# Patient Record
Sex: FEMALE | Race: WHITE | Employment: FULL TIME | URBAN - METROPOLITAN AREA
[De-identification: names, ages, dates, MRNs, and addresses within clinical notes are randomized per-mention and may not be internally consistent; named-entity substitution may affect disease eponyms.]

---

## 2019-07-30 ENCOUNTER — OFFICE VISIT (OUTPATIENT)
Dept: FAMILY MEDICINE CLINIC | Age: 39
End: 2019-07-30
Payer: MEDICARE

## 2019-07-30 VITALS
HEART RATE: 76 BPM | HEIGHT: 64 IN | RESPIRATION RATE: 16 BRPM | WEIGHT: 169 LBS | DIASTOLIC BLOOD PRESSURE: 86 MMHG | BODY MASS INDEX: 28.85 KG/M2 | TEMPERATURE: 97.9 F | SYSTOLIC BLOOD PRESSURE: 114 MMHG

## 2019-07-30 DIAGNOSIS — R63.5 WEIGHT GAIN: ICD-10-CM

## 2019-07-30 DIAGNOSIS — Z01.419 PAP SMEAR, LOW-RISK: ICD-10-CM

## 2019-07-30 DIAGNOSIS — Z00.00 ENCOUNTER FOR MEDICAL EXAMINATION TO ESTABLISH CARE: Primary | ICD-10-CM

## 2019-07-30 DIAGNOSIS — Z86.39 HISTORY OF HYPOTHYROIDISM: ICD-10-CM

## 2019-07-30 DIAGNOSIS — F32.0 MILD MAJOR DEPRESSION (HCC): ICD-10-CM

## 2019-07-30 DIAGNOSIS — F41.9 ANXIETY: ICD-10-CM

## 2019-07-30 DIAGNOSIS — Z13.220 LIPID SCREENING: ICD-10-CM

## 2019-07-30 DIAGNOSIS — Z13.31 POSITIVE DEPRESSION SCREENING: ICD-10-CM

## 2019-07-30 DIAGNOSIS — R53.83 FATIGUE, UNSPECIFIED TYPE: ICD-10-CM

## 2019-07-30 PROCEDURE — 96160 PT-FOCUSED HLTH RISK ASSMT: CPT | Performed by: PHYSICIAN ASSISTANT

## 2019-07-30 PROCEDURE — G8431 POS CLIN DEPRES SCRN F/U DOC: HCPCS | Performed by: PHYSICIAN ASSISTANT

## 2019-07-30 PROCEDURE — 99204 OFFICE O/P NEW MOD 45 MIN: CPT | Performed by: PHYSICIAN ASSISTANT

## 2019-07-30 PROCEDURE — G8419 CALC BMI OUT NRM PARAM NOF/U: HCPCS | Performed by: PHYSICIAN ASSISTANT

## 2019-07-30 PROCEDURE — 4004F PT TOBACCO SCREEN RCVD TLK: CPT | Performed by: PHYSICIAN ASSISTANT

## 2019-07-30 PROCEDURE — G8427 DOCREV CUR MEDS BY ELIG CLIN: HCPCS | Performed by: PHYSICIAN ASSISTANT

## 2019-07-30 RX ORDER — SERTRALINE HYDROCHLORIDE 25 MG/1
25 TABLET, FILM COATED ORAL DAILY
Qty: 30 TABLET | Refills: 3 | Status: SHIPPED | OUTPATIENT
Start: 2019-07-30 | End: 2019-11-05 | Stop reason: SDUPTHER

## 2019-07-30 ASSESSMENT — ENCOUNTER SYMPTOMS
ABDOMINAL PAIN: 0
COUGH: 0
SORE THROAT: 0
DIARRHEA: 0
SHORTNESS OF BREATH: 0
BLOOD IN STOOL: 0
WHEEZING: 0
VOMITING: 0
ABDOMINAL DISTENTION: 0
CHEST TIGHTNESS: 0
EYE REDNESS: 0
BACK PAIN: 0
RECTAL PAIN: 0
COLOR CHANGE: 0
CONSTIPATION: 0
SINUS PRESSURE: 0
NAUSEA: 0
PHOTOPHOBIA: 0
ANAL BLEEDING: 0

## 2019-07-30 ASSESSMENT — PATIENT HEALTH QUESTIONNAIRE - PHQ9
8. MOVING OR SPEAKING SO SLOWLY THAT OTHER PEOPLE COULD HAVE NOTICED. OR THE OPPOSITE, BEING SO FIGETY OR RESTLESS THAT YOU HAVE BEEN MOVING AROUND A LOT MORE THAN USUAL: 0
1. LITTLE INTEREST OR PLEASURE IN DOING THINGS: 3
SUM OF ALL RESPONSES TO PHQ QUESTIONS 1-9: 19
10. IF YOU CHECKED OFF ANY PROBLEMS, HOW DIFFICULT HAVE THESE PROBLEMS MADE IT FOR YOU TO DO YOUR WORK, TAKE CARE OF THINGS AT HOME, OR GET ALONG WITH OTHER PEOPLE: 2
4. FEELING TIRED OR HAVING LITTLE ENERGY: 3
3. TROUBLE FALLING OR STAYING ASLEEP: 3
9. THOUGHTS THAT YOU WOULD BE BETTER OFF DEAD, OR OF HURTING YOURSELF: 1
5. POOR APPETITE OR OVEREATING: 3
2. FEELING DOWN, DEPRESSED OR HOPELESS: 3
SUM OF ALL RESPONSES TO PHQ9 QUESTIONS 1 & 2: 6
7. TROUBLE CONCENTRATING ON THINGS, SUCH AS READING THE NEWSPAPER OR WATCHING TELEVISION: 0
SUM OF ALL RESPONSES TO PHQ QUESTIONS 1-9: 19
6. FEELING BAD ABOUT YOURSELF - OR THAT YOU ARE A FAILURE OR HAVE LET YOURSELF OR YOUR FAMILY DOWN: 3

## 2019-07-30 NOTE — PROGRESS NOTES
testing  06/25/2015       No Known Allergies      Current Outpatient Medications   Medication Sig Dispense Refill    sertraline (ZOLOFT) 25 MG tablet Take 1 tablet by mouth daily 30 tablet 3     No current facility-administered medications for this visit. Social History     Tobacco Use    Smoking status: Current Every Day Smoker     Packs/day: 0.50     Years: 15.00     Pack years: 7.50     Types: Cigarettes    Smokeless tobacco: Never Used   Substance Use Topics    Alcohol use: No     Alcohol/week: 0.0 standard drinks    Drug use: No       Family History   Problem Relation Age of Onset    Diabetes Father     High Blood Pressure Father     Heart Disease Maternal Grandmother     Cancer Maternal Grandfather       ______________________________________________________________________  Review of Systems   Constitutional: Positive for unexpected weight change. Negative for appetite change, chills, diaphoresis, fatigue and fever. HENT: Negative for congestion, sinus pressure and sore throat. Eyes: Negative for photophobia, redness and visual disturbance. Respiratory: Negative for cough, chest tightness, shortness of breath and wheezing. Cardiovascular: Negative for chest pain, palpitations and leg swelling. Gastrointestinal: Negative for abdominal distention, abdominal pain, anal bleeding, blood in stool, constipation, diarrhea, nausea, rectal pain and vomiting. Genitourinary: Positive for menstrual problem and vaginal bleeding. Negative for decreased urine volume, difficulty urinating, dyspareunia, dysuria, enuresis, flank pain, frequency, genital sores, hematuria, pelvic pain, urgency, vaginal discharge and vaginal pain. Musculoskeletal: Negative for back pain and gait problem. Skin: Negative for color change, pallor and rash. Neurological: Negative for dizziness, syncope, weakness, light-headedness, numbness and headaches. Hematological: Negative for adenopathy. smear, low-risk  -     Jonel Maynard, [facility], OB/GYN, [employer]    Fatigue, unspecified type  -     Iron And TIBC; Future  -     Ferritin; Future    Weight gain  -     Iron And TIBC; Future  -     Ferritin; Future    Mild major depression ([address]    Positive depression screening  -     Positive Screen for Clinical Depression with a Documented Follow-up Plan         ______________________________________________________________________  Follow up and instructions:  · Return in about 3 months (around 10/30/2019), or if symptoms worsen or fail to improve. · Discussed use, benefit, and side effects of prescribed medications. Barriers to medication compliance addressed. All patient questions answered. Pt voiced understanding. · Patient given educational materials - see patient instructions    · Patient instructed to call the office or go directly to the ER for any worsening problems or concerns. Patient voiced understanding    Yuliya Rubio. [address]  7/30/2019, 3:06 PM    This note is created with the assistance of a speech-recognition program. While intending to generate a document that actually reflects the content of the visit, the document can still have some mistakes which may not have been identified and corrected by editing.

## 2019-07-30 NOTE — PROGRESS NOTES
Visit Information    Have you changed or started any medications since your last visit including any over-the-counter medicines, vitamins, or herbal medicines? no   Are you having any side effects from any of your medications? -  no  Have you stopped taking any of your medications? Is so, why? -  no    Have you seen any other physician or provider since your last visit? No  Have you had any other diagnostic tests since your last visit? No  Have you been seen in the emergency room and/or had an admission to a hospital since we last saw you? No  Have you had your routine dental cleaning in the past 6 months? no    Have you activated your [employer] account? If not, what are your barriers?  Yes     Patient Care Team:  Edwige Thomas MD as PCP - General  Edwige Thomas MD (Internal Medicine)    Medical History Review  Past Medical, Family, and Social History reviewed and does not contribute to the patient presenting condition    Health Maintenance   Topic Date Due    Varicella Vaccine (1 of 2 - 13+ 2-dose series) 07/19/1995    HIV screen  07/19/1997    Cervical cancer screen  07/19/2003    TSH testing  06/25/2015    Flu vaccine (1) 09/01/2019    DTaP/Tdap/Td vaccine (2 - Td) 02/11/2026    Pneumococcal 0-64 years Vaccine  Aged Out

## 2019-10-01 ENCOUNTER — TELEPHONE (OUTPATIENT)
Dept: FAMILY MEDICINE CLINIC | Age: 39
End: 2019-10-01

## 2019-11-01 ENCOUNTER — HOSPITAL ENCOUNTER (OUTPATIENT)
Age: 39
Discharge: HOME | End: 2019-11-01
Payer: MEDICARE

## 2019-11-01 DIAGNOSIS — Z13.220 LIPID SCREENING: ICD-10-CM

## 2019-11-01 DIAGNOSIS — Z86.39 HISTORY OF HYPOTHYROIDISM: ICD-10-CM

## 2019-11-01 DIAGNOSIS — R63.5 WEIGHT GAIN: ICD-10-CM

## 2019-11-01 DIAGNOSIS — Z00.00 ENCOUNTER FOR MEDICAL EXAMINATION TO ESTABLISH CARE: ICD-10-CM

## 2019-11-01 DIAGNOSIS — F41.9 ANXIETY: ICD-10-CM

## 2019-11-01 DIAGNOSIS — R53.83 FATIGUE, UNSPECIFIED TYPE: ICD-10-CM

## 2019-11-01 LAB
ABSOLUTE EOS #: 0.04 K/UL (ref 0–0.44)
ABSOLUTE IMMATURE GRANULOCYTE: <0.03 K/UL (ref 0–0.3)
ABSOLUTE LYMPH #: 1.36 K/UL (ref 1.1–3.7)
ABSOLUTE MONO #: 0.37 K/UL (ref 0.1–1.2)
ALBUMIN SERPL-MCNC: 4.3 G/DL (ref 3.5–5.2)
ALBUMIN/GLOBULIN RATIO: 1.2 (ref 1–2.5)
ALP BLD-CCNC: 70 U/L (ref 35–104)
ALT SERPL-CCNC: 55 U/L (ref 5–33)
ANION GAP SERPL CALCULATED.3IONS-SCNC: 13 MMOL/L (ref 9–17)
AST SERPL-CCNC: 38 U/L
BASOPHILS # BLD: 1 % (ref 0–2)
BASOPHILS ABSOLUTE: <0.03 K/UL (ref 0–0.2)
BILIRUB SERPL-MCNC: 0.59 MG/DL (ref 0.3–1.2)
BUN BLDV-MCNC: 9 MG/DL (ref 6–20)
BUN/CREAT BLD: ABNORMAL (ref 9–20)
CALCIUM SERPL-MCNC: 8.6 MG/DL (ref 8.6–10.4)
CHLORIDE BLD-SCNC: 104 MMOL/L (ref 98–107)
CHOLESTEROL, FASTING: 203 MG/DL
CHOLESTEROL/HDL RATIO: 2.9
CO2: 23 MMOL/L (ref 20–31)
CREAT SERPL-MCNC: 0.57 MG/DL (ref 0.5–0.9)
DIFFERENTIAL TYPE: ABNORMAL
EOSINOPHILS RELATIVE PERCENT: 1 % (ref 1–4)
FERRITIN: 42 UG/L (ref 13–150)
GFR AFRICAN AMERICAN: >60 ML/MIN
GFR NON-AFRICAN AMERICAN: >60 ML/MIN
GFR SERPL CREATININE-BSD FRML MDRD: ABNORMAL ML/MIN/{1.73_M2}
GFR SERPL CREATININE-BSD FRML MDRD: ABNORMAL ML/MIN/{1.73_M2}
GLUCOSE BLD-MCNC: 92 MG/DL (ref 70–99)
HCT VFR BLD CALC: 37.6 % (ref 36.3–47.1)
HDLC SERPL-MCNC: 69 MG/DL
HEMOGLOBIN: 12.7 G/DL (ref 11.9–15.1)
IMMATURE GRANULOCYTES: 0 %
IRON SATURATION: 49 % (ref 20–55)
IRON: 189 UG/DL (ref 37–145)
LDL CHOLESTEROL: 93 MG/DL (ref 0–130)
LYMPHOCYTES # BLD: 44 % (ref 24–43)
MCH RBC QN AUTO: 31 PG (ref 25.2–33.5)
MCHC RBC AUTO-ENTMCNC: 33.8 G/DL (ref 28.4–34.8)
MCV RBC AUTO: 91.7 FL (ref 82.6–102.9)
MONOCYTES # BLD: 12 % (ref 3–12)
NRBC AUTOMATED: 0 PER 100 WBC
PDW BLD-RTO: 12.8 % (ref 11.8–14.4)
PLATELET # BLD: 245 K/UL (ref 138–453)
PLATELET ESTIMATE: ABNORMAL
PMV BLD AUTO: 10.1 FL (ref 8.1–13.5)
POTASSIUM SERPL-SCNC: 4.2 MMOL/L (ref 3.7–5.3)
RBC # BLD: 4.1 M/UL (ref 3.95–5.11)
RBC # BLD: ABNORMAL 10*6/UL
SEG NEUTROPHILS: 42 % (ref 36–65)
SEGMENTED NEUTROPHILS ABSOLUTE COUNT: 1.32 K/UL (ref 1.5–8.1)
SODIUM BLD-SCNC: 140 MMOL/L (ref 135–144)
THYROXINE, FREE: 0.81 NG/DL (ref 0.93–1.7)
TOTAL IRON BINDING CAPACITY: 387 UG/DL (ref 250–450)
TOTAL PROTEIN: 7.8 G/DL (ref 6.4–8.3)
TRIGLYCERIDE, FASTING: 207 MG/DL
TSH SERPL DL<=0.05 MIU/L-ACNC: 6.24 MIU/L (ref 0.3–5)
UNSATURATED IRON BINDING CAPACITY: 198 UG/DL (ref 112–347)
VLDLC SERPL CALC-MCNC: ABNORMAL MG/DL (ref 1–30)
WBC # BLD: 3.1 K/UL (ref 3.5–11.3)
WBC # BLD: ABNORMAL 10*3/UL

## 2019-11-01 PROCEDURE — 80061 LIPID PANEL: CPT

## 2019-11-01 PROCEDURE — 82728 ASSAY OF FERRITIN: CPT

## 2019-11-01 PROCEDURE — 36415 COLL VENOUS BLD VENIPUNCTURE: CPT

## 2019-11-01 PROCEDURE — 83550 IRON BINDING TEST: CPT

## 2019-11-01 PROCEDURE — 80053 COMPREHEN METABOLIC PANEL: CPT

## 2019-11-01 PROCEDURE — 85025 COMPLETE CBC W/AUTO DIFF WBC: CPT

## 2019-11-01 PROCEDURE — 84439 ASSAY OF FREE THYROXINE: CPT

## 2019-11-01 PROCEDURE — 83540 ASSAY OF IRON: CPT

## 2019-11-01 PROCEDURE — 84443 ASSAY THYROID STIM HORMONE: CPT

## 2019-11-04 DIAGNOSIS — E03.9 ACQUIRED HYPOTHYROIDISM: Primary | ICD-10-CM

## 2019-11-04 RX ORDER — LEVOTHYROXINE SODIUM 0.03 MG/1
25 TABLET ORAL DAILY
Qty: 30 TABLET | Refills: 1 | Status: SHIPPED | OUTPATIENT
Start: 2019-11-04 | End: 2020-03-05

## 2019-11-05 ENCOUNTER — OFFICE VISIT (OUTPATIENT)
Dept: FAMILY MEDICINE CLINIC | Age: 39
End: 2019-11-05
Payer: MEDICARE

## 2019-11-05 VITALS
WEIGHT: 180.2 LBS | TEMPERATURE: 97.1 F | SYSTOLIC BLOOD PRESSURE: 130 MMHG | RESPIRATION RATE: 16 BRPM | HEIGHT: 64 IN | DIASTOLIC BLOOD PRESSURE: 85 MMHG | BODY MASS INDEX: 30.77 KG/M2 | HEART RATE: 90 BPM

## 2019-11-05 DIAGNOSIS — F41.9 ANXIETY: ICD-10-CM

## 2019-11-05 DIAGNOSIS — Z13.220 LIPID SCREENING: ICD-10-CM

## 2019-11-05 DIAGNOSIS — R74.01 ELEVATED AST (SGOT): ICD-10-CM

## 2019-11-05 DIAGNOSIS — E03.9 ACQUIRED HYPOTHYROIDISM: ICD-10-CM

## 2019-11-05 DIAGNOSIS — Z23 NEED FOR 23-POLYVALENT PNEUMOCOCCAL POLYSACCHARIDE VACCINE: ICD-10-CM

## 2019-11-05 DIAGNOSIS — Z23 NEED FOR INFLUENZA VACCINATION: Primary | ICD-10-CM

## 2019-11-05 PROCEDURE — 90471 IMMUNIZATION ADMIN: CPT | Performed by: PHYSICIAN ASSISTANT

## 2019-11-05 PROCEDURE — G8427 DOCREV CUR MEDS BY ELIG CLIN: HCPCS | Performed by: PHYSICIAN ASSISTANT

## 2019-11-05 PROCEDURE — G8417 CALC BMI ABV UP PARAM F/U: HCPCS | Performed by: PHYSICIAN ASSISTANT

## 2019-11-05 PROCEDURE — 90686 IIV4 VACC NO PRSV 0.5 ML IM: CPT | Performed by: PHYSICIAN ASSISTANT

## 2019-11-05 PROCEDURE — 90472 IMMUNIZATION ADMIN EACH ADD: CPT | Performed by: PHYSICIAN ASSISTANT

## 2019-11-05 PROCEDURE — 90732 PPSV23 VACC 2 YRS+ SUBQ/IM: CPT | Performed by: PHYSICIAN ASSISTANT

## 2019-11-05 PROCEDURE — 99214 OFFICE O/P EST MOD 30 MIN: CPT | Performed by: PHYSICIAN ASSISTANT

## 2019-11-05 PROCEDURE — 4004F PT TOBACCO SCREEN RCVD TLK: CPT | Performed by: PHYSICIAN ASSISTANT

## 2019-11-05 PROCEDURE — G8482 FLU IMMUNIZE ORDER/ADMIN: HCPCS | Performed by: PHYSICIAN ASSISTANT

## 2019-11-05 RX ORDER — SERTRALINE HYDROCHLORIDE 25 MG/1
25 TABLET, FILM COATED ORAL DAILY
Qty: 30 TABLET | Refills: 5 | Status: SHIPPED | OUTPATIENT
Start: 2019-11-05 | End: 2020-04-28 | Stop reason: SDUPTHER

## 2019-11-05 ASSESSMENT — ENCOUNTER SYMPTOMS
CONSTIPATION: 0
BLOOD IN STOOL: 0
CHEST TIGHTNESS: 0
DIARRHEA: 0
NAUSEA: 0
WHEEZING: 0
BACK PAIN: 0
VOMITING: 0
SHORTNESS OF BREATH: 0
ABDOMINAL PAIN: 0
COUGH: 0

## 2020-03-05 RX ORDER — LEVOTHYROXINE SODIUM 0.03 MG/1
TABLET ORAL
Qty: 30 TABLET | Refills: 1 | Status: SHIPPED | OUTPATIENT
Start: 2020-03-05 | End: 2020-04-28 | Stop reason: SDUPTHER

## 2020-04-28 RX ORDER — LEVOTHYROXINE SODIUM 0.03 MG/1
TABLET ORAL
Qty: 30 TABLET | Refills: 1 | Status: ON HOLD | OUTPATIENT
Start: 2020-04-28 | End: 2021-07-23 | Stop reason: HOSPADM

## 2020-04-28 RX ORDER — SERTRALINE HYDROCHLORIDE 25 MG/1
25 TABLET, FILM COATED ORAL DAILY
Qty: 30 TABLET | Refills: 5 | Status: ON HOLD | OUTPATIENT
Start: 2020-04-28 | End: 2021-07-23 | Stop reason: HOSPADM

## 2021-03-22 ENCOUNTER — TELEPHONE (OUTPATIENT)
Dept: FAMILY MEDICINE CLINIC | Age: 41
End: 2021-03-22

## 2021-03-22 NOTE — TELEPHONE ENCOUNTER
Attempted to contact patient to reschedule their no-show appointment with our office. Patient's spouse answered and stated they will have the patient call back later to reschedule, patient is currently at work.

## 2021-07-15 ENCOUNTER — HOSPITAL ENCOUNTER (INPATIENT)
Age: 41
LOS: 4 days | Discharge: PSYCHIATRIC OR SUBSTANCE USE FACILITY | DRG: 816 | End: 2021-07-20
Attending: EMERGENCY MEDICINE | Admitting: INTERNAL MEDICINE
Payer: MEDICARE

## 2021-07-15 ENCOUNTER — APPOINTMENT (OUTPATIENT)
Dept: CT IMAGING | Age: 41
DRG: 816 | End: 2021-07-15
Payer: MEDICARE

## 2021-07-15 ENCOUNTER — APPOINTMENT (OUTPATIENT)
Dept: GENERAL RADIOLOGY | Age: 41
DRG: 816 | End: 2021-07-15
Payer: MEDICARE

## 2021-07-15 DIAGNOSIS — M62.82 NON-TRAUMATIC RHABDOMYOLYSIS: ICD-10-CM

## 2021-07-15 DIAGNOSIS — E16.2 HYPOGLYCEMIA: Primary | ICD-10-CM

## 2021-07-15 PROBLEM — T40.1X1A HEROIN OVERDOSE, ACCIDENTAL OR UNINTENTIONAL, INITIAL ENCOUNTER (HCC): Status: ACTIVE | Noted: 2021-07-15

## 2021-07-15 LAB
ABSOLUTE EOS #: 0 K/UL (ref 0–0.4)
ABSOLUTE IMMATURE GRANULOCYTE: ABNORMAL K/UL (ref 0–0.3)
ABSOLUTE LYMPH #: 0.7 K/UL (ref 1–4.8)
ABSOLUTE MONO #: 1.5 K/UL (ref 0.1–1.3)
ACETAMINOPHEN LEVEL: <5 UG/ML (ref 10–30)
ALBUMIN SERPL-MCNC: 4.6 G/DL (ref 3.5–5.2)
ALBUMIN/GLOBULIN RATIO: ABNORMAL (ref 1–2.5)
ALP BLD-CCNC: 72 U/L (ref 35–104)
ALT SERPL-CCNC: 62 U/L (ref 5–33)
ANION GAP SERPL CALCULATED.3IONS-SCNC: 14 MMOL/L (ref 9–17)
AST SERPL-CCNC: 62 U/L
BASOPHILS # BLD: 1 % (ref 0–2)
BASOPHILS ABSOLUTE: 0.1 K/UL (ref 0–0.2)
BILIRUB SERPL-MCNC: 0.25 MG/DL (ref 0.3–1.2)
BUN BLDV-MCNC: 8 MG/DL (ref 6–20)
BUN/CREAT BLD: ABNORMAL (ref 9–20)
CALCIUM SERPL-MCNC: 10.1 MG/DL (ref 8.6–10.4)
CHLORIDE BLD-SCNC: 106 MMOL/L (ref 98–107)
CO2: 23 MMOL/L (ref 20–31)
CREAT SERPL-MCNC: 1.31 MG/DL (ref 0.5–0.9)
DIFFERENTIAL TYPE: ABNORMAL
EOSINOPHILS RELATIVE PERCENT: 0 % (ref 0–4)
ETHANOL PERCENT: <0.01 %
ETHANOL: <10 MG/DL
GFR AFRICAN AMERICAN: 55 ML/MIN
GFR NON-AFRICAN AMERICAN: 45 ML/MIN
GFR SERPL CREATININE-BSD FRML MDRD: ABNORMAL ML/MIN/{1.73_M2}
GFR SERPL CREATININE-BSD FRML MDRD: ABNORMAL ML/MIN/{1.73_M2}
GLUCOSE BLD-MCNC: 144 MG/DL (ref 65–105)
GLUCOSE BLD-MCNC: 33 MG/DL (ref 70–99)
GLUCOSE BLD-MCNC: 47 MG/DL (ref 65–105)
HCG QUALITATIVE: NEGATIVE
HCT VFR BLD CALC: 38.3 % (ref 36–46)
HEMOGLOBIN: 13 G/DL (ref 12–16)
IMMATURE GRANULOCYTES: ABNORMAL %
LIPASE: 53 U/L (ref 13–60)
LYMPHOCYTES # BLD: 5 % (ref 24–44)
MAGNESIUM: 2.3 MG/DL (ref 1.6–2.6)
MCH RBC QN AUTO: 29.5 PG (ref 26–34)
MCHC RBC AUTO-ENTMCNC: 33.8 G/DL (ref 31–37)
MCV RBC AUTO: 87.1 FL (ref 80–100)
MONOCYTES # BLD: 11 % (ref 1–7)
MYOGLOBIN: 888 NG/ML (ref 25–58)
NRBC AUTOMATED: ABNORMAL PER 100 WBC
PDW BLD-RTO: 14.8 % (ref 11.5–14.9)
PLATELET # BLD: 309 K/UL (ref 150–450)
PLATELET ESTIMATE: ABNORMAL
PMV BLD AUTO: 7.8 FL (ref 6–12)
POTASSIUM SERPL-SCNC: 3.8 MMOL/L (ref 3.7–5.3)
RBC # BLD: 4.4 M/UL (ref 4–5.2)
RBC # BLD: ABNORMAL 10*6/UL
SALICYLATE LEVEL: <1 MG/DL (ref 3–10)
SARS-COV-2, RAPID: NOT DETECTED
SEG NEUTROPHILS: 83 % (ref 36–66)
SEGMENTED NEUTROPHILS ABSOLUTE COUNT: 11.6 K/UL (ref 1.3–9.1)
SODIUM BLD-SCNC: 143 MMOL/L (ref 135–144)
SPECIMEN DESCRIPTION: NORMAL
TOTAL CK: 259 U/L (ref 26–192)
TOTAL PROTEIN: 8.6 G/DL (ref 6.4–8.3)
TOXIC TRICYCLIC SC,BLOOD: ABNORMAL
WBC # BLD: 13.9 K/UL (ref 3.5–11)
WBC # BLD: ABNORMAL 10*3/UL

## 2021-07-15 PROCEDURE — 80143 DRUG ASSAY ACETAMINOPHEN: CPT

## 2021-07-15 PROCEDURE — 83874 ASSAY OF MYOGLOBIN: CPT

## 2021-07-15 PROCEDURE — 99223 1ST HOSP IP/OBS HIGH 75: CPT | Performed by: INTERNAL MEDICINE

## 2021-07-15 PROCEDURE — 80307 DRUG TEST PRSMV CHEM ANLYZR: CPT

## 2021-07-15 PROCEDURE — 93005 ELECTROCARDIOGRAM TRACING: CPT | Performed by: STUDENT IN AN ORGANIZED HEALTH CARE EDUCATION/TRAINING PROGRAM

## 2021-07-15 PROCEDURE — 6360000002 HC RX W HCPCS: Performed by: NURSE PRACTITIONER

## 2021-07-15 PROCEDURE — 82947 ASSAY GLUCOSE BLOOD QUANT: CPT

## 2021-07-15 PROCEDURE — 85025 COMPLETE CBC W/AUTO DIFF WBC: CPT

## 2021-07-15 PROCEDURE — 71045 X-RAY EXAM CHEST 1 VIEW: CPT

## 2021-07-15 PROCEDURE — 83735 ASSAY OF MAGNESIUM: CPT

## 2021-07-15 PROCEDURE — 99285 EMERGENCY DEPT VISIT HI MDM: CPT

## 2021-07-15 PROCEDURE — 82550 ASSAY OF CK (CPK): CPT

## 2021-07-15 PROCEDURE — 80053 COMPREHEN METABOLIC PANEL: CPT

## 2021-07-15 PROCEDURE — G0480 DRUG TEST DEF 1-7 CLASSES: HCPCS

## 2021-07-15 PROCEDURE — 87635 SARS-COV-2 COVID-19 AMP PRB: CPT

## 2021-07-15 PROCEDURE — 96374 THER/PROPH/DIAG INJ IV PUSH: CPT

## 2021-07-15 PROCEDURE — 80179 DRUG ASSAY SALICYLATE: CPT

## 2021-07-15 PROCEDURE — 83690 ASSAY OF LIPASE: CPT

## 2021-07-15 PROCEDURE — 2580000003 HC RX 258: Performed by: NURSE PRACTITIONER

## 2021-07-15 PROCEDURE — 84703 CHORIONIC GONADOTROPIN ASSAY: CPT

## 2021-07-15 PROCEDURE — 36415 COLL VENOUS BLD VENIPUNCTURE: CPT

## 2021-07-15 PROCEDURE — 96361 HYDRATE IV INFUSION ADD-ON: CPT

## 2021-07-15 PROCEDURE — 2580000003 HC RX 258: Performed by: STUDENT IN AN ORGANIZED HEALTH CARE EDUCATION/TRAINING PROGRAM

## 2021-07-15 PROCEDURE — G0378 HOSPITAL OBSERVATION PER HR: HCPCS

## 2021-07-15 PROCEDURE — 70450 CT HEAD/BRAIN W/O DYE: CPT

## 2021-07-15 PROCEDURE — 2500000003 HC RX 250 WO HCPCS

## 2021-07-15 RX ORDER — ONDANSETRON 2 MG/ML
4 INJECTION INTRAMUSCULAR; INTRAVENOUS ONCE
Status: COMPLETED | OUTPATIENT
Start: 2021-07-15 | End: 2021-07-15

## 2021-07-15 RX ORDER — 0.9 % SODIUM CHLORIDE 0.9 %
1000 INTRAVENOUS SOLUTION INTRAVENOUS ONCE
Status: COMPLETED | OUTPATIENT
Start: 2021-07-15 | End: 2021-07-15

## 2021-07-15 RX ORDER — DEXTROSE MONOHYDRATE 25 G/50ML
INJECTION, SOLUTION INTRAVENOUS
Status: COMPLETED
Start: 2021-07-15 | End: 2021-07-15

## 2021-07-15 RX ORDER — DEXTROSE MONOHYDRATE 25 G/50ML
25 INJECTION, SOLUTION INTRAVENOUS ONCE
Status: COMPLETED | OUTPATIENT
Start: 2021-07-15 | End: 2021-07-15

## 2021-07-15 RX ORDER — 0.9 % SODIUM CHLORIDE 0.9 %
1000 INTRAVENOUS SOLUTION INTRAVENOUS ONCE
Status: COMPLETED | OUTPATIENT
Start: 2021-07-15 | End: 2021-07-16

## 2021-07-15 RX ADMIN — DEXTROSE MONOHYDRATE 25 G: 25 INJECTION, SOLUTION INTRAVENOUS at 20:20

## 2021-07-15 RX ADMIN — SODIUM CHLORIDE 1000 ML: 9 INJECTION, SOLUTION INTRAVENOUS at 22:30

## 2021-07-15 RX ADMIN — SODIUM CHLORIDE 1000 ML: 9 INJECTION, SOLUTION INTRAVENOUS at 21:06

## 2021-07-15 RX ADMIN — ONDANSETRON 4 MG: 2 INJECTION INTRAMUSCULAR; INTRAVENOUS at 23:04

## 2021-07-15 ASSESSMENT — ENCOUNTER SYMPTOMS
NAUSEA: 1
SHORTNESS OF BREATH: 0
BACK PAIN: 0
RHINORRHEA: 0
COUGH: 0
WHEEZING: 0
TROUBLE SWALLOWING: 0
VOMITING: 0
DIARRHEA: 0
ABDOMINAL PAIN: 0
PHOTOPHOBIA: 0
ALLERGIC/IMMUNOLOGIC NEGATIVE: 1

## 2021-07-15 NOTE — ED PROVIDER NOTES
Living Expenses: Patient refused   Food Insecurity: Unknown    Worried About Running Out of Food in the Last Year: Patient refused   [address] in the Last Year: Patient refused   Transportation Needs: Unknown    Lack of Transportation (Medical): Patient refused    Lack of Transportation (Non-Medical): Patient refused   Physical Activity:     Days of Exercise per Week:     Minutes of Exercise per Session:    Stress:     Feeling of Stress :    Social Connections:     Frequency of Communication with Friends and Family:     Frequency of Social Gatherings with Friends and Family:     Attends Taoist Services:     Active Member of Clubs or Organizations:     Attends Club or Organization Meetings:     Marital Status:    Intimate Partner Violence:     Fear of Current or Ex-Partner:     Emotionally Abused:     Physically Abused:     Sexually Abused:        Family History   Problem Relation Age of Onset    Diabetes Father     High Blood Pressure Father     Heart Disease Maternal Grandmother     Cancer Maternal Grandfather         Allergies:  Patient has no known allergies. Home Medications:  Prior to Admission medications    Medication Sig Start Date End Date Taking? Authorizing Provider   levothyroxine (SYNTHROID) 25 MCG tablet TAKE ONE TABLET BY MOUTH DAILY 4/28/20   Pastor Domenic PA-C   sertraline (ZOLOFT) 25 MG tablet Take 1 tablet by mouth daily 4/28/20   Pastor Domenic PA-C       REVIEW OFSYSTEMS    (2-9 systems for level 4, 10 or more for level 5)      Review of Systems   Unable to perform ROS: Mental status change       PHYSICAL EXAM   (up to 7 for level 4, 8 or more forlevel 5)      INITIAL VITALS:   ED Triage Vitals   BP Temp Temp src Pulse Resp SpO2 Height Weight   -- -- -- -- -- -- -- --       Physical Exam  Constitutional:       Appearance: Normal appearance. HENT:      Head: Normocephalic and atraumatic.       Mouth/Throat:      Mouth: Mucous membranes are moist.   Eyes: Extraocular Movements: Extraocular movements intact. Pupils: Pupils are equal, round, and reactive to light. Cardiovascular:      Rate and Rhythm: Regular rhythm. Tachycardia present. Pulmonary:      Effort: Pulmonary effort is normal.   Abdominal:      Palpations: Abdomen is soft. Tenderness: There is no abdominal tenderness. Musculoskeletal:         General: No swelling. Cervical back: No rigidity. Skin:     General: Skin is warm. Capillary Refill: Capillary refill takes less than 2 seconds. Coloration: Skin is not jaundiced. Neurological:      Mental Status: She is alert. DIFFERENTIAL  DIAGNOSIS     PLAN (LABS / IMAGING / EKG):  Orders Placed This Encounter   Procedures    COVID-19, Rapid    CT Head WO Contrast    XR CHEST PORTABLE    XR ELBOW RIGHT (MIN 3 VIEWS)    HCG Qualitative, Serum    Urinalysis, reflex to microscopic    CBC Auto Differential    Comprehensive Metabolic Panel    Magnesium    Lipase    URINE DRUG SCREEN    TOX SCR, BLD, ED    CK    Myoglobin    Drug screen, tricyclic    Sitter at bedside    Telemetry monitoring - continuous duration    Inpatient consult to Internal Medicine    Pharmacy to Dose: Vancomycin    POCT Glucose    POC Glucose Fingerstick    POC Glucose Fingerstick    EKG 12 Lead    PATIENT STATUS (FROM ED OR OR/PROCEDURAL) Observation       MEDICATIONS ORDERED:  Orders Placed This Encounter   Medications    dextrose 50 % solution     Sophia Barfield: cabinet override    dextrose 50 % IV solution    0.9 % sodium chloride bolus    0.9 % sodium chloride bolus    ondansetron (ZOFRAN) injection 4 mg    vancomycin (VANCOCIN) intermittent dosing (placeholder)     Order Specific Question:   Antimicrobial Indications     Answer:   Skin and Soft Tissue Infection       DDX: To her mental status secondary to intracranial pathology versus drug overdose versus toxic ingestion versus infection    Initial MDM/Plan: 47 y.o. female who presents with true mental status. On presentation patient with a GCS of 14 (opening up her eyes to voice), but she follows commands and motor intact. She is tachycardic however she is 96% on room air, normotensive. Patient is not answering questions. She is not in active withdrawal.  Pupils dilated, no obvious head trauma. Abdomen soft nontender nondistended, lungs clear to auscultate bilaterally no crackles heard. Heart regular rate rhythm. Given that patient came with altered mental status, will obtain CT head. We will also obtain labs including CBC, CMP, pregnancy test, ED tox and urine drug screen. Patient was given 10 mg of naloxone, will obtain chest x-ray to assess pulmonary edema secondary to naloxone administration. We will also obtain electrolytes to assess for abnormalities. Patient to have a sitter at bedside for fall precaution. Plan to admit patient. DIAGNOSTIC RESULTS / EMERGENCYDEPARTMENT COURSE / MDM     LABS:  Labs Reviewed   CBC WITH AUTO DIFFERENTIAL - Abnormal; Notable for the following components:       Result Value    WBC 13.9 (*)     Seg Neutrophils 83 (*)     Lymphocytes 5 (*)     Monocytes 11 (*)     Segs Absolute 11.60 (*)     Absolute Lymph # 0.70 (*)     Absolute Mono # 1.50 (*)     All other components within normal limits   COMPREHENSIVE METABOLIC PANEL - Abnormal; Notable for the following components:    Glucose 33 (*)     CREATININE 1.31 (*)     ALT 62 (*)     AST 62 (*)     Total Bilirubin 0.25 (*)     Total Protein 8.6 (*)     GFR Non- 45 (*)     GFR  55 (*)     All other components within normal limits   TOX SCR, BLD, ED - Abnormal; Notable for the following components:    Acetaminophen Level <5 (*)     Salicylate Lvl <1 (*)     All other components within normal limits   CK - Abnormal; Notable for the following components:     Total  (*)     All other components within normal limits   MYOGLOBIN, SERUM - Abnormal; Notable for the following components:    Myoglobin 888 (*)     All other components within normal limits   POC GLUCOSE FINGERSTICK - Abnormal; Notable for the following components:    POC Glucose 47 (*)     All other components within normal limits   POC GLUCOSE FINGERSTICK - Abnormal; Notable for the following components:    POC Glucose 144 (*)     All other components within normal limits   COVID-19, RAPID   HCG, SERUM, QUALITATIVE   MAGNESIUM   LIPASE   URINALYSIS   URINE DRUG SCREEN   DRUG SCREEN TRICYCLIC URINE   POCT GLUCOSE   POCT GLUCOSE   POCT GLUCOSE         RADIOLOGY:  CT Head WO Contrast    Result Date: 7/15/2021  EXAMINATION: CT OF THE HEAD WITHOUT CONTRAST  7/15/2021 6:25 pm TECHNIQUE: CT of the head was performed without the administration of intravenous contrast. Dose modulation, iterative reconstruction, and/or weight based adjustment of the mA/kV was utilized to reduce the radiation dose to as low as reasonably achievable. COMPARISON: None. HISTORY: ORDERING SYSTEM PROVIDED HISTORY: altered mentation TECHNOLOGIST PROVIDED HISTORY: altered mentation Decision Support Exception - unselect if not a suspected or confirmed emergency medical condition->Emergency Medical Condition (MA) Is the patient pregnant?->No Reason for Exam: Altered mental status Acuity: Acute Type of Exam: Initial FINDINGS: BRAIN/VENTRICLES: There is no acute intracranial hemorrhage, mass effect or midline shift. No abnormal extra-axial fluid collection. The gray-white differentiation is maintained without evidence of an acute infarct. There is no evidence of hydrocephalus. ORBITS: The visualized portion of the orbits demonstrate no acute abnormality. SINUSES: The visualized paranasal sinuses and mastoid air cells demonstrate no acute abnormality. SOFT TISSUES/SKULL:  No acute abnormality of the visualized skull or soft tissues. No acute intracranial abnormality.      XR CHEST PORTABLE    Result Date: 7/15/2021  EXAMINATION: ONE XRAY VIEW OF THE CHEST 7/15/2021 7:34 pm COMPARISON: None. HISTORY: ORDERING SYSTEM PROVIDED HISTORY: altered, given naloxone TECHNOLOGIST PROVIDED HISTORY: altered, given naloxone Reason for Exam: altered, given naloxone Acuity: Acute Type of Exam: Initial Additional signs and symptoms: altered, given naloxone Relevant Medical/Surgical History: altered, given naloxone 30-year-old female with altered mental status; given Naloxone FINDINGS: AP portable upright view of the chest. Trachea midline. Cardiac and mediastinal contours within normal limits. No pneumothorax. No free air. No acute focal airspace consolidation or pleural effusions. No acute osseous abnormality evident. No acute focal airspace consolidation         EKG  EKG Interpretation    Interpreted by me    Rhythm: normal sinus   Rate: Tachycardia  Axis: normal  Ectopy: none  Conduction: normal  ST Segments: no acute change  T Waves: no acute change  Q Waves: none    Clinical Impression: Poor quality data, sinus tachycardia    All EKG's are interpreted by the Emergency Department Physicianwho either signs or Co-signs this chart in the absence of a cardiologist.    EMERGENCY DEPARTMENT COURSE:  ED Course as of Jul 16 0032 Thu Jul 15, 2021   2025 Patient reassessed, dates that the heroin use was not intentional.  States that she drug addict 5 years ago however she quit. She thought that she can handle the dose that she took. [AN]   2138 Patient with critical glucose of 33, given amp of D 50 with subsequent glucose of 144. CK and myoglobin elevated-she is given a bolus of fluids. Has elevation of white count. Her chest x-ray is unremarkable, her CT head is negative for bleed. Plan to admit patient for rhabdomyolysis. [AN]   Fri Jul 16, 2021   0029 I was notified that patient had swelling or redness over her right elbow. On assessment, patient does have erythematous skin over right elbow, indurated, warm.   She does have diminished range of motion over right shoulder. Plan to xray elbow. Admitting team notified     [AN]      ED Course User Index  [AN] Castro Shukla MD          PROCEDURES:  None    CONSULTS:  IP CONSULT TO INTERNAL MEDICINE  PHARMACY TO DOSE VANCOMYCIN    CRITICAL CARE:      FINAL IMPRESSION      1. Hypoglycemia    2. Non-traumatic rhabdomyolysis          DISPOSITION / PLAN     DISPOSITION        PATIENT REFERRED TO:  No follow-up provider specified.     DISCHARGE MEDICATIONS:  Current Discharge Medication List          Castro Shukla MD  Emergency Medicine Resident    (Please note that portions of this note were completed with a voice recognition program.Efforts were made to edit the dictations but occasionally words are mis-transcribed.)        Castro Shukla MD  Resident  07/15/21 [id number]       Castro Shukla MD  Resident  07/16/21 1992

## 2021-07-15 NOTE — ED PROVIDER NOTES
EMERGENCY DEPARTMENT ENCOUNTER   ATTENDING ATTESTATION     Pt Name: Janelle Rutherford  MRN: [id number]  [place] 1980  Date of evaluation: 7/15/21       Janlele Rutherford is a 47 y.o. female who presents with Drug Overdose      MDM:   Overdose, given 10 mg narcan by EMS  She is awake but agitated  Suspect anoxic injury  GCS 14  She is protecting airway, doesn't need intubation  tox workup  Ct brain  Likely admit for OD with AMS    Vitals:   Vitals:    07/15/21 1831   BP: 106/77   Pulse: 139   Resp: 18   Weight: 180 lb (81.6 kg)   Height: 5' 4\" (1.626 m)         I personally evaluated and examined the patient in conjunction with the resident and agree with the assessment, treatment plan, and disposition of the patient as recorded by the resident. I performed a history and physical examination of the patient and discussed management with the resident. I reviewed the residents note and agree with the documented findings and plan of care. Any areas of disagreement are noted on the chart. I was personally present for the key portions of any procedures. I have documented in the chart those procedures where I was not present during the key portions. I have personally reviewed all images and agree with the resident's interpretation. I have reviewed the emergency nurses triage note. I agree with the chief complaint, past medical history, past surgical history, allergies, medications, social and family history as documented unless otherwise noted.     Mariya Obrien MD  Attending Emergency Physician            Mariya Obrien MD  07/15/21 [id number]

## 2021-07-16 ENCOUNTER — APPOINTMENT (OUTPATIENT)
Dept: GENERAL RADIOLOGY | Age: 41
DRG: 816 | End: 2021-07-16
Payer: MEDICARE

## 2021-07-16 PROBLEM — L03.113 CELLULITIS OF RIGHT UPPER EXTREMITY: Status: ACTIVE | Noted: 2021-07-16

## 2021-07-16 LAB
-: ABNORMAL
AMORPHOUS: ABNORMAL
AMPHETAMINE SCREEN URINE: POSITIVE
ANION GAP SERPL CALCULATED.3IONS-SCNC: 8 MMOL/L (ref 9–17)
BACTERIA: ABNORMAL
BARBITURATE SCREEN URINE: NEGATIVE
BENZODIAZEPINE SCREEN, URINE: NEGATIVE
BILIRUBIN URINE: ABNORMAL
BUN BLDV-MCNC: 7 MG/DL (ref 6–20)
BUN/CREAT BLD: ABNORMAL (ref 9–20)
BUPRENORPHINE URINE: ABNORMAL
CALCIUM SERPL-MCNC: 8.4 MG/DL (ref 8.6–10.4)
CANNABINOID SCREEN URINE: NEGATIVE
CASTS UA: ABNORMAL /LPF
CHLORIDE BLD-SCNC: 103 MMOL/L (ref 98–107)
CO2: 25 MMOL/L (ref 20–31)
COCAINE METABOLITE, URINE: POSITIVE
COLOR: ABNORMAL
COMMENT UA: ABNORMAL
CREAT SERPL-MCNC: 0.86 MG/DL (ref 0.5–0.9)
CRYSTALS, UA: ABNORMAL /HPF
EKG ATRIAL RATE: 137 BPM
EKG P AXIS: 79 DEGREES
EKG P-R INTERVAL: 128 MS
EKG Q-T INTERVAL: 372 MS
EKG QRS DURATION: 74 MS
EKG QTC CALCULATION (BAZETT): 561 MS
EKG R AXIS: 72 DEGREES
EKG T AXIS: 78 DEGREES
EKG VENTRICULAR RATE: 137 BPM
EPITHELIAL CELLS UA: ABNORMAL /HPF
GFR AFRICAN AMERICAN: >60 ML/MIN
GFR NON-AFRICAN AMERICAN: >60 ML/MIN
GFR SERPL CREATININE-BSD FRML MDRD: ABNORMAL ML/MIN/{1.73_M2}
GFR SERPL CREATININE-BSD FRML MDRD: ABNORMAL ML/MIN/{1.73_M2}
GLUCOSE BLD-MCNC: 135 MG/DL (ref 65–105)
GLUCOSE BLD-MCNC: 90 MG/DL (ref 70–99)
GLUCOSE URINE: ABNORMAL
HCT VFR BLD CALC: 32.6 % (ref 36–46)
HEMOGLOBIN: 11 G/DL (ref 12–16)
KETONES, URINE: ABNORMAL
LEUKOCYTE ESTERASE, URINE: NEGATIVE
MAGNESIUM: 1.8 MG/DL (ref 1.6–2.6)
MCH RBC QN AUTO: 29.8 PG (ref 26–34)
MCHC RBC AUTO-ENTMCNC: 33.8 G/DL (ref 31–37)
MCV RBC AUTO: 88 FL (ref 80–100)
MDMA URINE: ABNORMAL
METHADONE SCREEN, URINE: NEGATIVE
METHAMPHETAMINE, URINE: ABNORMAL
MUCUS: ABNORMAL
MYOGLOBIN: 183 NG/ML (ref 25–58)
MYOGLOBIN: 339 NG/ML (ref 25–58)
NITRITE, URINE: NEGATIVE
NRBC AUTOMATED: ABNORMAL PER 100 WBC
OPIATES, URINE: NEGATIVE
OTHER OBSERVATIONS UA: ABNORMAL
OXYCODONE SCREEN URINE: NEGATIVE
PDW BLD-RTO: 15 % (ref 11.5–14.9)
PH UA: 6 (ref 5–8)
PHENCYCLIDINE, URINE: NEGATIVE
PLATELET # BLD: 228 K/UL (ref 150–450)
PMV BLD AUTO: 7.6 FL (ref 6–12)
POTASSIUM SERPL-SCNC: 3.4 MMOL/L (ref 3.7–5.3)
PROPOXYPHENE, URINE: ABNORMAL
PROTEIN UA: ABNORMAL
RBC # BLD: 3.71 M/UL (ref 4–5.2)
RBC UA: ABNORMAL /HPF
RENAL EPITHELIAL, UA: ABNORMAL /HPF
SODIUM BLD-SCNC: 136 MMOL/L (ref 135–144)
SPECIFIC GRAVITY UA: 1.02 (ref 1–1.03)
TEST INFORMATION: ABNORMAL
TOTAL CK: 571 U/L (ref 26–192)
TOTAL CK: 774 U/L (ref 26–192)
TRICHOMONAS: ABNORMAL
TRICYCLIC ANTIDEP,URINE: NEGATIVE
TRICYCLIC ANTIDEPRESSANTS, UR: ABNORMAL
TSH SERPL DL<=0.05 MIU/L-ACNC: 4.67 MIU/L (ref 0.3–5)
TURBIDITY: ABNORMAL
URINE HGB: NEGATIVE
UROBILINOGEN, URINE: NORMAL
WBC # BLD: 6.5 K/UL (ref 3.5–11)
WBC UA: ABNORMAL /HPF
YEAST: ABNORMAL

## 2021-07-16 PROCEDURE — 2060000000 HC ICU INTERMEDIATE R&B

## 2021-07-16 PROCEDURE — 2580000003 HC RX 258: Performed by: INTERNAL MEDICINE

## 2021-07-16 PROCEDURE — 82947 ASSAY GLUCOSE BLOOD QUANT: CPT

## 2021-07-16 PROCEDURE — 2500000003 HC RX 250 WO HCPCS: Performed by: NURSE PRACTITIONER

## 2021-07-16 PROCEDURE — 80307 DRUG TEST PRSMV CHEM ANLYZR: CPT

## 2021-07-16 PROCEDURE — 84443 ASSAY THYROID STIM HORMONE: CPT

## 2021-07-16 PROCEDURE — 6360000002 HC RX W HCPCS: Performed by: INTERNAL MEDICINE

## 2021-07-16 PROCEDURE — 82550 ASSAY OF CK (CPK): CPT

## 2021-07-16 PROCEDURE — 73080 X-RAY EXAM OF ELBOW: CPT

## 2021-07-16 PROCEDURE — 2580000003 HC RX 258: Performed by: NURSE PRACTITIONER

## 2021-07-16 PROCEDURE — 83874 ASSAY OF MYOGLOBIN: CPT

## 2021-07-16 PROCEDURE — 85027 COMPLETE CBC AUTOMATED: CPT

## 2021-07-16 PROCEDURE — 80048 BASIC METABOLIC PNL TOTAL CA: CPT

## 2021-07-16 PROCEDURE — 81001 URINALYSIS AUTO W/SCOPE: CPT

## 2021-07-16 PROCEDURE — 36415 COLL VENOUS BLD VENIPUNCTURE: CPT

## 2021-07-16 PROCEDURE — 6370000000 HC RX 637 (ALT 250 FOR IP): Performed by: NURSE PRACTITIONER

## 2021-07-16 PROCEDURE — 73030 X-RAY EXAM OF SHOULDER: CPT

## 2021-07-16 PROCEDURE — 6360000002 HC RX W HCPCS: Performed by: NURSE PRACTITIONER

## 2021-07-16 PROCEDURE — 93010 ELECTROCARDIOGRAM REPORT: CPT | Performed by: INTERNAL MEDICINE

## 2021-07-16 PROCEDURE — 83735 ASSAY OF MAGNESIUM: CPT

## 2021-07-16 RX ORDER — ACETAMINOPHEN 650 MG/1
650 SUPPOSITORY RECTAL EVERY 6 HOURS PRN
Status: DISCONTINUED | OUTPATIENT
Start: 2021-07-16 | End: 2021-07-20 | Stop reason: HOSPADM

## 2021-07-16 RX ORDER — SODIUM CHLORIDE 0.9 % (FLUSH) 0.9 %
5-40 SYRINGE (ML) INJECTION EVERY 12 HOURS SCHEDULED
Status: DISCONTINUED | OUTPATIENT
Start: 2021-07-16 | End: 2021-07-20 | Stop reason: HOSPADM

## 2021-07-16 RX ORDER — SODIUM CHLORIDE 9 MG/ML
25 INJECTION, SOLUTION INTRAVENOUS PRN
Status: DISCONTINUED | OUTPATIENT
Start: 2021-07-16 | End: 2021-07-20 | Stop reason: HOSPADM

## 2021-07-16 RX ORDER — DEXTROSE MONOHYDRATE 50 MG/ML
100 INJECTION, SOLUTION INTRAVENOUS PRN
Status: DISCONTINUED | OUTPATIENT
Start: 2021-07-16 | End: 2021-07-20 | Stop reason: HOSPADM

## 2021-07-16 RX ORDER — ONDANSETRON 2 MG/ML
4 INJECTION INTRAMUSCULAR; INTRAVENOUS EVERY 6 HOURS PRN
Status: DISCONTINUED | OUTPATIENT
Start: 2021-07-16 | End: 2021-07-20 | Stop reason: HOSPADM

## 2021-07-16 RX ORDER — LEVOTHYROXINE SODIUM 0.03 MG/1
25 TABLET ORAL DAILY
Status: DISCONTINUED | OUTPATIENT
Start: 2021-07-16 | End: 2021-07-20 | Stop reason: HOSPADM

## 2021-07-16 RX ORDER — ACETAMINOPHEN 325 MG/1
650 TABLET ORAL EVERY 6 HOURS PRN
Status: DISCONTINUED | OUTPATIENT
Start: 2021-07-16 | End: 2021-07-20 | Stop reason: HOSPADM

## 2021-07-16 RX ORDER — DEXTROSE MONOHYDRATE 25 G/50ML
12.5 INJECTION, SOLUTION INTRAVENOUS PRN
Status: DISCONTINUED | OUTPATIENT
Start: 2021-07-16 | End: 2021-07-20 | Stop reason: HOSPADM

## 2021-07-16 RX ORDER — POLYETHYLENE GLYCOL 3350 17 G/17G
17 POWDER, FOR SOLUTION ORAL DAILY PRN
Status: DISCONTINUED | OUTPATIENT
Start: 2021-07-16 | End: 2021-07-20 | Stop reason: HOSPADM

## 2021-07-16 RX ORDER — POTASSIUM CHLORIDE 20 MEQ/1
40 TABLET, EXTENDED RELEASE ORAL PRN
Status: DISCONTINUED | OUTPATIENT
Start: 2021-07-16 | End: 2021-07-20 | Stop reason: HOSPADM

## 2021-07-16 RX ORDER — MAGNESIUM SULFATE 1 G/100ML
1000 INJECTION INTRAVENOUS PRN
Status: DISCONTINUED | OUTPATIENT
Start: 2021-07-16 | End: 2021-07-20 | Stop reason: HOSPADM

## 2021-07-16 RX ORDER — ONDANSETRON 4 MG/1
4 TABLET, FILM COATED ORAL EVERY 8 HOURS PRN
Status: DISCONTINUED | OUTPATIENT
Start: 2021-07-16 | End: 2021-07-20 | Stop reason: HOSPADM

## 2021-07-16 RX ORDER — CIPROFLOXACIN 2 MG/ML
400 INJECTION, SOLUTION INTRAVENOUS EVERY 12 HOURS
Status: DISCONTINUED | OUTPATIENT
Start: 2021-07-16 | End: 2021-07-20 | Stop reason: HOSPADM

## 2021-07-16 RX ORDER — SODIUM CHLORIDE 0.9 % (FLUSH) 0.9 %
10 SYRINGE (ML) INJECTION PRN
Status: DISCONTINUED | OUTPATIENT
Start: 2021-07-16 | End: 2021-07-20 | Stop reason: HOSPADM

## 2021-07-16 RX ORDER — POTASSIUM CHLORIDE 7.45 MG/ML
10 INJECTION INTRAVENOUS PRN
Status: DISCONTINUED | OUTPATIENT
Start: 2021-07-16 | End: 2021-07-20 | Stop reason: HOSPADM

## 2021-07-16 RX ORDER — NICOTINE 21 MG/24HR
1 PATCH, TRANSDERMAL 24 HOURS TRANSDERMAL DAILY
Status: DISCONTINUED | OUTPATIENT
Start: 2021-07-16 | End: 2021-07-20 | Stop reason: HOSPADM

## 2021-07-16 RX ORDER — NICOTINE POLACRILEX 4 MG
15 LOZENGE BUCCAL PRN
Status: DISCONTINUED | OUTPATIENT
Start: 2021-07-16 | End: 2021-07-20 | Stop reason: HOSPADM

## 2021-07-16 RX ORDER — SODIUM CHLORIDE 9 MG/ML
INJECTION, SOLUTION INTRAVENOUS CONTINUOUS
Status: DISCONTINUED | OUTPATIENT
Start: 2021-07-16 | End: 2021-07-19

## 2021-07-16 RX ADMIN — ACETAMINOPHEN 650 MG: 325 TABLET ORAL at 12:36

## 2021-07-16 RX ADMIN — VANCOMYCIN HYDROCHLORIDE 1000 MG: 1 INJECTION, POWDER, LYOPHILIZED, FOR SOLUTION INTRAVENOUS at 17:45

## 2021-07-16 RX ADMIN — POTASSIUM CHLORIDE 40 MEQ: 1500 TABLET, EXTENDED RELEASE ORAL at 14:20

## 2021-07-16 RX ADMIN — SODIUM CHLORIDE: 9 INJECTION, SOLUTION INTRAVENOUS at 00:51

## 2021-07-16 RX ADMIN — FAMOTIDINE 20 MG: 10 INJECTION, SOLUTION INTRAVENOUS at 00:50

## 2021-07-16 RX ADMIN — FAMOTIDINE 20 MG: 10 INJECTION, SOLUTION INTRAVENOUS at 21:28

## 2021-07-16 RX ADMIN — ENOXAPARIN SODIUM 40 MG: 40 INJECTION SUBCUTANEOUS at 00:50

## 2021-07-16 RX ADMIN — SODIUM CHLORIDE, PRESERVATIVE FREE 10 ML: 5 INJECTION INTRAVENOUS at 21:29

## 2021-07-16 RX ADMIN — SODIUM CHLORIDE, PRESERVATIVE FREE 10 ML: 5 INJECTION INTRAVENOUS at 10:22

## 2021-07-16 RX ADMIN — CIPROFLOXACIN 400 MG: 2 INJECTION, SOLUTION INTRAVENOUS at 21:29

## 2021-07-16 RX ADMIN — ONDANSETRON 4 MG: 2 INJECTION INTRAMUSCULAR; INTRAVENOUS at 00:58

## 2021-07-16 RX ADMIN — ONDANSETRON 4 MG: 2 INJECTION INTRAMUSCULAR; INTRAVENOUS at 13:28

## 2021-07-16 RX ADMIN — VANCOMYCIN HYDROCHLORIDE 2000 MG: 1 INJECTION, POWDER, LYOPHILIZED, FOR SOLUTION INTRAVENOUS at 01:34

## 2021-07-16 ASSESSMENT — PAIN DESCRIPTION - FREQUENCY
FREQUENCY: CONTINUOUS
FREQUENCY: CONTINUOUS

## 2021-07-16 ASSESSMENT — PAIN DESCRIPTION - DESCRIPTORS
DESCRIPTORS: ACHING;DISCOMFORT
DESCRIPTORS: ACHING;DISCOMFORT

## 2021-07-16 ASSESSMENT — PAIN DESCRIPTION - ORIENTATION
ORIENTATION: RIGHT

## 2021-07-16 ASSESSMENT — PAIN DESCRIPTION - LOCATION
LOCATION: ARM

## 2021-07-16 ASSESSMENT — PAIN DESCRIPTION - ONSET
ONSET: ON-GOING
ONSET: ON-GOING

## 2021-07-16 ASSESSMENT — PAIN DESCRIPTION - PAIN TYPE
TYPE: ACUTE PAIN

## 2021-07-16 ASSESSMENT — PAIN SCALES - GENERAL
PAINLEVEL_OUTOF10: 6
PAINLEVEL_OUTOF10: 0

## 2021-07-16 ASSESSMENT — PAIN DESCRIPTION - PROGRESSION
CLINICAL_PROGRESSION: NOT CHANGED
CLINICAL_PROGRESSION: NOT CHANGED

## 2021-07-16 ASSESSMENT — ENCOUNTER SYMPTOMS: COLOR CHANGE: 1

## 2021-07-16 NOTE — PLAN OF CARE
Problem: Falls - Risk of:  Goal: Will remain free from falls  Description: Will remain free from falls  7/16/2021 0637 by Fly Givens RN  Outcome: Ongoing  7/16/2021 0637 by Fly Givens RN  Outcome: Ongoing  Goal: Absence of physical injury  Description: Absence of physical injury  7/16/2021 0637 by Fly Givens RN  Outcome: Ongoing  7/16/2021 0637 by Fly Givens RN  Outcome: Ongoing     Problem: Skin Integrity:  Goal: Will show no infection signs and symptoms  Description: Will show no infection signs and symptoms  7/16/2021 0637 by Fly Givens RN  Outcome: Ongoing  7/16/2021 0637 by Fly Givens RN  Outcome: Ongoing  Goal: Absence of new skin breakdown  Description: Absence of new skin breakdown  7/16/2021 0637 by Fly Givens RN  Outcome: Ongoing  7/16/2021 0637 by Fly Givens RN  Outcome: Ongoing     Problem: Pain:  Goal: Pain level will decrease  Description: Pain level will decrease  7/16/2021 0637 by Fly Givens RN  Outcome: Ongoing  7/16/2021 0637 by Fly Givens RN  Outcome: Ongoing  Goal: Control of acute pain  Description: Control of acute pain  7/16/2021 0637 by Fly Givens RN  Outcome: Ongoing  7/16/2021 0637 by Fly Givens RN  Outcome: Ongoing  Goal: Control of chronic pain  Description: Control of chronic pain  7/16/2021 0637 by Fly Givens RN  Outcome: Ongoing  7/16/2021 0637 by Fly Givens RN  Outcome: Ongoing     Problem: Coping - Ineffective, Individual:  Goal: Ability to identify and develop effective coping behavior will improve  Description: Ability to identify and develop effective coping behavior will improve  Outcome: Ongoing     Problem: Discharge Planning:  Goal: Absence of hematoma at arterial access site  Description: Participation in substance abuse program  Outcome: Ongoing     Problem: Health Maintenance - Impaired:  Goal: Ability to manage health-related needs will improve  Description: Ability to manage health-related needs will improve  Outcome: Ongoing     Problem: Injury - Risk of, Substance Overdose:  Goal: No signs of physiological stress  Description: Absence of drug withdrawal signs and symptoms  Outcome: Ongoing  Goal: Ability to remain free from injury will improve  Description: Ability to remain free from injury will improve  Outcome: Ongoing     Problem: Mood - Altered:  Goal: Mood stable  Description: Mood stable  Outcome: Ongoing     Problem: Violence - Risk of, Self/Other-Directed:  Goal: Knowledge of developmental care interventions  Description: Absence of violence  Outcome: Ongoing

## 2021-07-16 NOTE — PROGRESS NOTES
Pharmacy to dose vancomycin  Vancomycin Day: 1    Patient srcr has trending down with hydration, will increase vanco to 1000mg IV every 12 hrs at this time. Vanco trough ordered on 7/17. Target trough level 12-15 mcg/ml. Alyssa Amador. [address] [facility]

## 2021-07-16 NOTE — ED NOTES
Provided pt w apple juice and 12oz glass of ice water.   Pt sitting up in bed, call light within reach,      Iram Rico  07/15/21 [id number]

## 2021-07-16 NOTE — ED NOTES
Critical BS of 33 noted. Dr Kameron Daniels notified. D50 given.      Mayur Sorensen RN  07/15/21 2021

## 2021-07-16 NOTE — CARE COORDINATION
CASE MANAGEMENT NOTE:    Admission Date:  7/15/2021 Diogenes Suarez is a 47 y.o.  female    Admitted for : Heroin overdose, accidental or unintentional, [facility] [T40.1X1A]    Met with:  Patient    PCP:  Has no PCP,  Wants to find her own physician, will give [facility]                                Insurance:  [place] [employer]      Is patient alert and oriented at time of discussion:  Yes    Current Residence/ Living Arrangements:  independently at home             Current Services PTA:  No    Does patient go to outpatient dialysis: No  If yes, location and chair time:     Is patient agreeable to VNS: No    Freedom of choice provided:  NA    List of [address] provided: NA    VNS chosen:  No    DME:  none    Home Oxygen: No    Nebulizer: No    CPAP/BIPAP: No    Supplier: N/A    Potential Assistance Needed: Yes Drug rehab information, notified Thomas Benites of this. SNF needed: No    Freedom of choice and list provided: NA    [facility]:  [facility]       Does Patient want to use MEDS to BEDS? No    Is patient currently receiving oral anticoagulation therapy? No    Is the Patient an [facility] with Readmission Risk Score greater than 14%? No  If yes, pt needs a follow up appointment made within 7 days. Family Members/Caregivers that pt would like involved in their care:    Yes    If yes, list name here:  [place] Mediate:  Family             Discharge Plan:  7/1621 - [place] [employer] - Patient is from home. Has no DME's, Says she has been clean  For 5 years and made a bad decision to try heroin again. She says she usually snorts it. However  Right upper arm swollen and red. Orange header patient risk n/a, HA no PCP, wants to find a PCP, will give [facility] Number. Notified RANJEET Paige to give drug rehab info for inpt and outpt to patient. Will follow . //pf                 Electronically signed by: Sabrina Potts RN on 7/16/2021 at 1:15 PM

## 2021-07-16 NOTE — ED NOTES
Report given to Debora Richardson from [address] [facility] room 2088. Report method by phone   The following was reviewed with receiving RN:xray then to floor for right elbow    Current vital signs:  BP (!) 152/108   Pulse 120   Temp 98.1 °F (36.7 °C) (Oral)   Resp 16   Ht 5' 4\" (1.626 m)   Wt 180 lb (81.6 kg)   SpO2 94%   BMI 30.90 kg/m²                MEWS Score: 3     Any medication or safety alerts were reviewed. Any pending diagnostics and notifications were also reviewed, as well as any safety concerns or issues, abnormal labs, abnormal imaging, and abnormal assessment findings. Questions were answered.           Bean Barron RN  07/16/21 0004

## 2021-07-16 NOTE — PROGRESS NOTES
Pharmacy Note  Vancomycin Consult    William Penny is a 47 y.o. female started on Vancomycin for cellulitis; consult received from Mario Alberto Patel CNP to manage therapy. Patient Active Problem List   Diagnosis    Depression    Tobacco abuse    Subclinical hypothyroidism    Anxiety    Fatigue    History of hypothyroidism    Weight gain    Heroin overdose, accidental or unintentional, initial encounter ([facility], [place] [address]    Rhabdomyolysis    Hypoglycemia    Cellulitis of right upper extremity     Allergies:  Patient has no known allergies. Temp max: 98.4    Recent Labs     07/15/21  1940   BUN 8   CREATININE 1.31*   WBC 13.9*       Intake/Output Summary (Last 24 hours) at 7/16/2021 0028  Last data filed at 7/15/2021 2205  Gross per 24 hour   Intake 1000 ml   Output --   Net 1000 ml     Culture Date      Source                       Results      Ht Readings from Last 1 Encounters:   07/15/21 5' 4\" (1.626 m)        Wt Readings from Last 1 Encounters:   07/15/21 180 lb (81.6 kg)       Body mass index is 30.9 kg/m². Estimated Creatinine Clearance: 60 mL/min (A) (based on SCr of 1.31 mg/dL (H)). Goal Trough Level: 10-20 mcg/mL    Assessment/Plan:  Will initiate Vancomycin with a one time loading dose of 2000 mg x1, followed by 1250 mg IV every 24 hours. Timing of trough level will be determined based on culture results, renal function, and clinical response. Thank you for the consult. Will continue to follow.      Austin Given, [facility]     -7/16/2021 at 12:30 AM

## 2021-07-16 NOTE — H&P
[address] [facility] [place]     HISTORY AND PHYSICAL EXAMINATION            Date:   7/16/2021  Patientname:  Lyndsey Moura  Date of admission:  7/15/2021  6:30 PM  MRN:   [id number]  Account:  [de-identified]  YOB: 1980  PCP:    Leonard JERONIMO PA-C  Room:   03/03  Code Status:    No Order    CHIEF COMPLAINT     Chief Complaint   Patient presents with    Drug Overdose       HISTORY OF PRESENT ILLNESS  (Character, Onset, Location, Duration,  Exacerbating/RelievingFactors, Radiation,   Associated Symptoms, Severity )      The patient is a 47 y.o.  female, with a history of hypothyroidism, depression, anxiety, tobacco use, and heroin abuse. Patient presents with altered mental status after heroin overdose. Patient states she is been clean off heroin for 5 years prior to today. According to EMS report, patient was doing heroin with her friend and was found down at home by her boyfriend. When EMS arrived, a total of 10 mg of Narcan was given and the patient became responsive. Patient denies headache, visual disturbance, chest pain, cough, shortness of breath, abdominal pain, vomiting or back pain. She does also complain and nausea. Patient denies suicidal ideations. States overdose was unintentional.    HPI   1) Location/Symptom altered mental status, heroin overdose, nausea  2) Timing/Onset: Today  3) Context/Setting: Found down at home by boyfriend, received Narcan 10 mg became responsive  4) Quality: N/A  5) Duration: Continuous nausea  6) Modifying Factors: N/A  7) Severity: moderate      PAST MEDICAL HISTORY   Patient  has a past medical history of Anxiety, Depression, and Subclinical hypothyroidism. PAST SURGICAL HISTORY    Patient  has a past surgical history that includes Tubal ligation.      FAMILY HISTORY    Patient family history includes Cancer in her maternal grandfather; Diabetes in her father; Heart Disease in her maternal grandmother; High Blood Pressure in her father. SOCIAL HISTORY    Patient  reports that she has been smoking cigarettes. She has a 7.50 pack-year smoking history. She has never used smokeless tobacco. She reports that she does not drink alcohol and does not use drugs. HOME MEDICATIONS        Prior to Admission medications    Medication Sig Start Date End Date Taking? Authorizing Provider   levothyroxine (SYNTHROID) 25 MCG tablet TAKE ONE TABLET BY MOUTH DAILY 4/28/20   Bhargavi Max PA-C   sertraline (ZOLOFT) 25 MG tablet Take 1 tablet by mouth daily 4/28/20   Bhargavi Max PA-C       ALLERGIES      Patient has no known allergies. REVIEW OF SYSTEMS     Review of Systems   Constitutional: Positive for activity change and appetite change. Negative for chills and fever. HENT: Negative for congestion, rhinorrhea and trouble swallowing. Eyes: Negative for photophobia and visual disturbance. Respiratory: Negative for cough, shortness of breath and wheezing. Cardiovascular: Negative for chest pain. Gastrointestinal: Positive for nausea. Negative for abdominal pain, diarrhea and vomiting. Endocrine: Negative for polydipsia, polyphagia and polyuria. Genitourinary: Negative for dysuria and flank pain. Musculoskeletal: Negative for arthralgias, back pain, myalgias and neck pain. Skin: Positive for color change and pallor. Negative for rash. Allergic/Immunologic: Negative. Neurological: Negative for dizziness, speech difficulty, weakness, light-headedness, numbness and headaches. Hematological: Negative. Psychiatric/Behavioral: Positive for decreased concentration. Negative for confusion and suicidal ideas. The patient is nervous/anxious. PHYSICAL EXAM      BP (!) 152/108   Pulse 120   Temp 98.1 °F (36.7 °C) (Oral)   Resp 16   Ht 5' 4\" (1.626 m)   Wt 180 lb (81.6 kg)   SpO2 94%   BMI 30.90 kg/m²  Body mass index is 30.9 kg/m². Physical Exam  Constitutional:       General: She is not in acute distress. none  Conduction: normal  ST Segments: no acute change  T Waves: no acute change  Q Waves: none     Clinical Impression: Poor quality data, sinus tachycardia    Labs:  CBC:   Recent Labs     07/15/21  1940   WBC 13.9*   HGB 13.0        BMP:    Recent Labs     07/15/21  1940      K 3.8      CO2 23   BUN 8   CREATININE 1.31*   GLUCOSE 33*     S. Calcium:  Recent Labs     07/15/21  1940   CALCIUM 10.1     S. Ionized Calcium:No results for input(s): IONCA in the last 72 hours. S. Magnesium:  Recent Labs     07/15/21  1940   MG 2.3     S. Phosphorus:No results for input(s): PHOS in the last 72 hours. S. Glucose:  Recent Labs     07/15/21  2013 07/15/21  2104   POCGLU 47* 144*     Glycosylated hemoglobin A1C: No results found for: LABA1C  Hepatic:   Recent Labs     07/15/21  1940   AST 62*   ALT 62*   ALKPHOS 72     CARDIAC ENZY:   Recent Labs     07/15/21  1940   CKTOTAL 259*   MYOGLOBIN 888*     INR: No results for input(s): INR in the last 72 hours. BNP: No results for input(s): PROBNP in the last 72 hours. ABGs: No results for input(s): PH, PCO2, PO2, HCO3, O2SAT in the last 72 hours. Lipids: No results for input(s): CHOL, TRIG, HDL, LDLCALC in the last 72 hours. Invalid input(s): LDL  Pancreatic functions:  Recent Labs     07/15/21  1940   LIPASE 53     S. LacticAcid: No results for input(s): LACTA in the last 72 hours. Thyroid functions:   Lab Results   Component Value Date    TSH 6.24 11/01/2019      U/A:No results for input(s): NITRITE, COLORU, WBCUA, RBCUA, MUCUS, BACTERIA, CLARITYU, SPECGRAV, LEUKOCYTESUR, BLOODU, GLUCOSEU, AMORPHOUS in the last 72 hours.     Invalid input(s): Lawson Lopez    Imaging/Diagonstics:     CT Head WO Contrast    Result Date: 7/15/2021  EXAMINATION: CT OF THE HEAD WITHOUT CONTRAST  7/15/2021 6:25 pm TECHNIQUE: CT of the head was performed without the administration of intravenous contrast. Dose modulation, iterative reconstruction, and/or weight based adjustment of the mA/kV was utilized to reduce the radiation dose to as low as reasonably achievable. COMPARISON: None. HISTORY: ORDERING SYSTEM PROVIDED HISTORY: altered mentation TECHNOLOGIST PROVIDED HISTORY: altered mentation Decision Support Exception - unselect if not a suspected or confirmed emergency medical condition->Emergency Medical Condition (MA) Is the patient pregnant?->No Reason for Exam: Altered mental status Acuity: Acute Type of Exam: Initial FINDINGS: BRAIN/VENTRICLES: There is no acute intracranial hemorrhage, mass effect or midline shift. No abnormal extra-axial fluid collection. The gray-white differentiation is maintained without evidence of an acute infarct. There is no evidence of hydrocephalus. ORBITS: The visualized portion of the orbits demonstrate no acute abnormality. SINUSES: The visualized paranasal sinuses and mastoid air cells demonstrate no acute abnormality. SOFT TISSUES/SKULL:  No acute abnormality of the visualized skull or soft tissues. No acute intracranial abnormality. XR CHEST PORTABLE    Result Date: 7/15/2021  EXAMINATION: ONE XRAY VIEW OF THE CHEST 7/15/2021 7:34 pm COMPARISON: None. HISTORY: ORDERING SYSTEM PROVIDED HISTORY: altered, given naloxone TECHNOLOGIST PROVIDED HISTORY: altered, given naloxone Reason for Exam: altered, given naloxone Acuity: Acute Type of Exam: Initial Additional signs and symptoms: altered, given naloxone Relevant Medical/Surgical History: altered, given naloxone 77-year-old female with altered mental status; given Naloxone FINDINGS: AP portable upright view of the chest. Trachea midline. Cardiac and mediastinal contours within normal limits. No pneumothorax. No free air. No acute focal airspace consolidation or pleural effusions. No acute osseous abnormality evident.      No acute focal airspace consolidation     ASSESSMENT  and  PLAN     Principal Problem:    Heroin overdose, accidental or unintentional, initial encounter ([facility] [address]  Active Problems:    Depression    Tobacco abuse    Anxiety    History of hypothyroidism    Rhabdomyolysis    Hypoglycemia    Cellulitis of right upper extremity  Resolved Problems:    * No resolved hospital problems. *    Plan:    Unintentional heroin overdose  -Patient received 10 mg Narcan by EMS became responsive  -CT head negative for acute intracranial abnormality  -Chest x-ray shows no acute focal airspace consolidation  -Acetaminophen level <5  -Ethanol <24  -Salicylate <1  -Awaiting urine drug screen and urinalysis  -Zofran as needed    Rhabdomyolysis  -Initial , myoglobin 888  -Trend CK and myoglobin  -Patient given 2 L normal saline bolus  -Maintenance [email or web address]    Cellulitis of right upper extremity  -Patient states she injected heroin into the right AC  -Will obtain x-ray to rule out foreign body  -Pharmacy to dose vancomycin    History of hypothyroidism  -Patient supposed to be on Synthroid 25 mcg daily but states she has not taken in over a year  -Check TSH in the morning  -Patient states she also has not been taking her Zoloft in over a year    Hypoglycemia  -Glucose 33 in the ED  -Patient given amp of dextrose and juice  -Recheck glucose 47 and 144  -Continue to monitor glucose  -Hypoglycemia protocol    Tobacco use  -Nicotine patch    GI prophylaxis-Pepcid 20 mg IV twice daily  DVT prophylaxis-Lovenox 40 mg subcu daily    Consultations:     IP CONSULT TO INTERNAL MEDICINE  PHARMACY TO [facility]   7/16/2021  12:11 AM    Marisa Bower [address] [place] [address]. Phone [phone] and add on       I have discussed the care of Mannie Hager ,   including pertinent history and exam findings,      7/16/21   with the Gertrudis Quintero CNP  I have seen and examined the patient and the key elements of all parts of the encounter have been performed by me .    I agree with the assessment, plan and orders as documented by the resident. Principal Problem:    Heroin overdose, accidental or unintentional, initial encounter ([facility] [address]  Active Problems:    Depression    Tobacco abuse    Anxiety    History of hypothyroidism    Rhabdomyolysis    Hypoglycemia    Cellulitis of right upper extremity  Resolved Problems:    * No resolved hospital problems. *        -    Condition    [x] ill ,     [x] high risk , [] critical ,          [] improved but still labile                                        [] delirium ,      [] -----,                 [] I----     Unit  [] ICU           [x] PICU       [] MED_SRG             []  Other    Prognosis     ---                   Medications: Allergies:  No Known Allergies    Current Meds:   Scheduled Meds:    [Held by provider] levothyroxine  25 mcg Oral Daily    [Held by provider] sertraline  25 mg Oral Daily    sodium chloride flush  5-40 mL Intravenous 2 times per day    enoxaparin  40 mg Subcutaneous Daily    famotidine (PEPCID) injection  20 mg Intravenous BID    nicotine  1 patch Transdermal Daily    vancomycin (VANCOCIN) intermittent dosing (placeholder)   Other RX Placeholder    vancomycin  1,000 mg Intravenous Q12H     Continuous Infusions:    sodium chloride      sodium chloride 150 mL/hr at 07/16/21 0051    dextrose       PRN Meds: sodium chloride flush, sodium chloride, potassium chloride **OR** potassium alternative oral replacement **OR** potassium chloride, magnesium sulfate, polyethylene glycol, acetaminophen **OR** acetaminophen, ondansetron, promethazine (PHENERGAN) in sodium chloride 0.9% IVPB, glucose, dextrose, glucagon (rDNA), dextrose      ---- ;     Melanie Garcia MD        [address], [address] [place] [address].    Phone [phone]   Fax: [phone]  Answering Service: [phone]

## 2021-07-16 NOTE — PROGRESS NOTES
Nutrition Assessment     Type and Reason for Visit: Positive Nutrition Screen    Nutrition Recommendations/Plan: Will continue to provide Regular diet. Nutrition Assessment:  Pt admitted due to OD. Pt now states no recent wt loss, N/V resolved. Pt is consuming small amounts of Regular diet. Pt deemed at low nutrition risk. Malnutrition Assessment:  Malnutrition Status: No malnutrition    Current Nutrition Therapies:    ADULT DIET;  Regular    Anthropometric Measures:  · Height: 5' 4\" (162.6 cm)  · Current Body Wt: 155 lb (70.3 kg)   · BMI: 26.6    Nutrition Diagnosis:   No nutrition diagnosis at this time     Nutrition Interventions:   Food and/or Nutrient Delivery:  Continue Current Diet  Nutrition Education/Counseling:  No recommendation at this time   Coordination of Nutrition Care:  Continue to monitor while inpatient  Food/Nutrient Intake Outcomes:  Food and Nutrient Intake    Discharge Planning:    Continue current diet     Electronically signed by Sandy Causey RD, LD on 7/16/21 at 1:36 PM EDT    Contact: [phone]

## 2021-07-17 ENCOUNTER — APPOINTMENT (OUTPATIENT)
Dept: GENERAL RADIOLOGY | Age: 41
DRG: 816 | End: 2021-07-17
Payer: MEDICARE

## 2021-07-17 LAB
-: NORMAL
ANION GAP SERPL CALCULATED.3IONS-SCNC: 11 MMOL/L (ref 9–17)
BUN BLDV-MCNC: 4 MG/DL (ref 6–20)
BUN/CREAT BLD: ABNORMAL (ref 9–20)
C-REACTIVE PROTEIN: 31.9 MG/L (ref 0–5)
CALCIUM SERPL-MCNC: 8.3 MG/DL (ref 8.6–10.4)
CHLORIDE BLD-SCNC: 102 MMOL/L (ref 98–107)
CO2: 20 MMOL/L (ref 20–31)
CREAT SERPL-MCNC: 0.68 MG/DL (ref 0.5–0.9)
GFR AFRICAN AMERICAN: >60 ML/MIN
GFR NON-AFRICAN AMERICAN: >60 ML/MIN
GFR SERPL CREATININE-BSD FRML MDRD: ABNORMAL ML/MIN/{1.73_M2}
GFR SERPL CREATININE-BSD FRML MDRD: ABNORMAL ML/MIN/{1.73_M2}
GLUCOSE BLD-MCNC: 111 MG/DL (ref 65–105)
GLUCOSE BLD-MCNC: 68 MG/DL (ref 65–105)
GLUCOSE BLD-MCNC: 70 MG/DL (ref 65–105)
GLUCOSE BLD-MCNC: 79 MG/DL (ref 65–105)
GLUCOSE BLD-MCNC: 83 MG/DL (ref 70–99)
GLUCOSE BLD-MCNC: 84 MG/DL (ref 65–105)
HCT VFR BLD CALC: 32.5 % (ref 36–46)
HEMOGLOBIN: 10.5 G/DL (ref 12–16)
MCH RBC QN AUTO: 30 PG (ref 26–34)
MCHC RBC AUTO-ENTMCNC: 32.4 G/DL (ref 31–37)
MCV RBC AUTO: 92.6 FL (ref 80–100)
NRBC AUTOMATED: ABNORMAL PER 100 WBC
PDW BLD-RTO: 15 % (ref 11.5–14.9)
PLATELET # BLD: 175 K/UL (ref 150–450)
PMV BLD AUTO: 7.9 FL (ref 6–12)
POTASSIUM SERPL-SCNC: 3.6 MMOL/L (ref 3.7–5.3)
RBC # BLD: 3.51 M/UL (ref 4–5.2)
REASON FOR REJECTION: NORMAL
SODIUM BLD-SCNC: 133 MMOL/L (ref 135–144)
VANCOMYCIN TROUGH DATE LAST DOSE: NORMAL
VANCOMYCIN TROUGH DOSE AMOUNT: 1000
VANCOMYCIN TROUGH TIME LAST DOSE: 251
VANCOMYCIN TROUGH: 15.7 UG/ML (ref 10–20)
WBC # BLD: 4.5 K/UL (ref 3.5–11)
ZZ NTE CLEAN UP: ORDERED TEST: NORMAL
ZZ NTE WITH NAME CLEAN UP: SPECIMEN SOURCE: NORMAL

## 2021-07-17 PROCEDURE — 6360000002 HC RX W HCPCS: Performed by: INTERNAL MEDICINE

## 2021-07-17 PROCEDURE — 6370000000 HC RX 637 (ALT 250 FOR IP): Performed by: INTERNAL MEDICINE

## 2021-07-17 PROCEDURE — 2580000003 HC RX 258: Performed by: INTERNAL MEDICINE

## 2021-07-17 PROCEDURE — 99232 SBSQ HOSP IP/OBS MODERATE 35: CPT | Performed by: INTERNAL MEDICINE

## 2021-07-17 PROCEDURE — 92950 HEART/LUNG RESUSCITATION CPR: CPT

## 2021-07-17 PROCEDURE — 2700000000 HC OXYGEN THERAPY PER DAY

## 2021-07-17 PROCEDURE — 80048 BASIC METABOLIC PNL TOTAL CA: CPT

## 2021-07-17 PROCEDURE — 36415 COLL VENOUS BLD VENIPUNCTURE: CPT

## 2021-07-17 PROCEDURE — 94761 N-INVAS EAR/PLS OXIMETRY MLT: CPT

## 2021-07-17 PROCEDURE — 80202 ASSAY OF VANCOMYCIN: CPT

## 2021-07-17 PROCEDURE — 71045 X-RAY EXAM CHEST 1 VIEW: CPT

## 2021-07-17 PROCEDURE — 2500000003 HC RX 250 WO HCPCS: Performed by: NURSE PRACTITIONER

## 2021-07-17 PROCEDURE — 82947 ASSAY GLUCOSE BLOOD QUANT: CPT

## 2021-07-17 PROCEDURE — 2060000000 HC ICU INTERMEDIATE R&B

## 2021-07-17 PROCEDURE — 85027 COMPLETE CBC AUTOMATED: CPT

## 2021-07-17 PROCEDURE — 99253 IP/OBS CNSLTJ NEW/EST LOW 45: CPT | Performed by: ORTHOPAEDIC SURGERY

## 2021-07-17 PROCEDURE — 6360000002 HC RX W HCPCS: Performed by: NURSE PRACTITIONER

## 2021-07-17 PROCEDURE — 2580000003 HC RX 258: Performed by: NURSE PRACTITIONER

## 2021-07-17 PROCEDURE — 86140 C-REACTIVE PROTEIN: CPT

## 2021-07-17 RX ORDER — NALOXONE HYDROCHLORIDE 0.4 MG/ML
0.4 INJECTION, SOLUTION INTRAMUSCULAR; INTRAVENOUS; SUBCUTANEOUS PRN
Status: DISCONTINUED | OUTPATIENT
Start: 2021-07-17 | End: 2021-07-20 | Stop reason: HOSPADM

## 2021-07-17 RX ORDER — CLONIDINE HYDROCHLORIDE 0.1 MG/1
0.1 TABLET ORAL 3 TIMES DAILY
Status: DISCONTINUED | OUTPATIENT
Start: 2021-07-17 | End: 2021-07-20 | Stop reason: HOSPADM

## 2021-07-17 RX ADMIN — ENOXAPARIN SODIUM 40 MG: 40 INJECTION SUBCUTANEOUS at 08:01

## 2021-07-17 RX ADMIN — VANCOMYCIN HYDROCHLORIDE 1000 MG: 1 INJECTION, POWDER, LYOPHILIZED, FOR SOLUTION INTRAVENOUS at 16:38

## 2021-07-17 RX ADMIN — SODIUM CHLORIDE: 9 INJECTION, SOLUTION INTRAVENOUS at 11:54

## 2021-07-17 RX ADMIN — SODIUM CHLORIDE, PRESERVATIVE FREE 10 ML: 5 INJECTION INTRAVENOUS at 08:01

## 2021-07-17 RX ADMIN — CIPROFLOXACIN 400 MG: 2 INJECTION, SOLUTION INTRAVENOUS at 15:09

## 2021-07-17 RX ADMIN — CLONIDINE HYDROCHLORIDE 0.1 MG: 0.1 TABLET ORAL at 15:09

## 2021-07-17 RX ADMIN — SODIUM CHLORIDE, PRESERVATIVE FREE 10 ML: 5 INJECTION INTRAVENOUS at 21:24

## 2021-07-17 RX ADMIN — SODIUM CHLORIDE: 9 INJECTION, SOLUTION INTRAVENOUS at 05:46

## 2021-07-17 RX ADMIN — VANCOMYCIN HYDROCHLORIDE 1000 MG: 1 INJECTION, POWDER, LYOPHILIZED, FOR SOLUTION INTRAVENOUS at 02:51

## 2021-07-17 RX ADMIN — CLONIDINE HYDROCHLORIDE 0.1 MG: 0.1 TABLET ORAL at 21:21

## 2021-07-17 RX ADMIN — FAMOTIDINE 20 MG: 10 INJECTION, SOLUTION INTRAVENOUS at 08:01

## 2021-07-17 RX ADMIN — ONDANSETRON 4 MG: 2 INJECTION INTRAMUSCULAR; INTRAVENOUS at 02:51

## 2021-07-17 RX ADMIN — FAMOTIDINE 20 MG: 10 INJECTION, SOLUTION INTRAVENOUS at 21:21

## 2021-07-17 RX ADMIN — SODIUM CHLORIDE: 9 INJECTION, SOLUTION INTRAVENOUS at 22:15

## 2021-07-17 RX ADMIN — CIPROFLOXACIN 400 MG: 2 INJECTION, SOLUTION INTRAVENOUS at 01:40

## 2021-07-17 ASSESSMENT — PAIN SCALES - GENERAL
PAINLEVEL_OUTOF10: 0
PAINLEVEL_OUTOF10: 0
PAINLEVEL_OUTOF10: 3

## 2021-07-17 ASSESSMENT — PAIN DESCRIPTION - PAIN TYPE: TYPE: ACUTE PAIN

## 2021-07-17 ASSESSMENT — PAIN DESCRIPTION - LOCATION: LOCATION: CHEST

## 2021-07-17 ASSESSMENT — PAIN DESCRIPTION - ORIENTATION: ORIENTATION: MID

## 2021-07-17 NOTE — PLAN OF CARE
Problem: Falls - Risk of:  Goal: Will remain free from falls  Description: Will remain free from falls  7/17/2021 0334 by Marck Funk RN  Outcome: Ongoing     Problem: Falls - Risk of:  Goal: Absence of physical injury  Description: Absence of physical injury  7/17/2021 0334 by Marck Funk RN  Outcome: Ongoing  Note: No falls noted this shift. Bed kept in low position. Safe environment maintained. Bedside table & call light in reach. Uses call light appropriately when needing assistance. Problem: Skin Integrity:  Goal: Will show no infection signs and symptoms  Description: Will show no infection signs and symptoms  7/16/2021 1705 by Edward Ferro RN  Outcome: Ongoing     Problem: Skin Integrity:  Goal: Will show no infection signs and symptoms  Description: Will show no infection signs and symptoms  7/17/2021 0334 by Marck Funk RN  Outcome: Ongoing     Problem: Skin Integrity:  Goal: Absence of new skin breakdown  Description: Absence of new skin breakdown  7/17/2021 0334 by Marck Funk RN  Outcome: Ongoing  Note: Skin assessment complete. Sensicare applied PRN. Area kept free from moisture. Proper nourishment and fluids encouraged, as appropriate. Will continue to monitor for additional needs and changes in skin breakdown. Problem: Skin Integrity:  Goal: Absence of new skin breakdown  Description: Absence of new skin breakdown  7/17/2021 0334 by Marck Funk RN  Outcome: Ongoing  Note: Skin assessment complete. Sensicare applied PRN. Area kept free from moisture. Proper nourishment and fluids encouraged, as appropriate. Will continue to monitor for additional needs and changes in skin breakdown. Problem: Skin Integrity:  Goal: Absence of new skin breakdown  Description: Absence of new skin breakdown  7/17/2021 0334 by Marck Funk RN  Outcome: Ongoing  Note: Skin assessment complete. Sensicare applied PRN.   Area kept free from moisture. Proper nourishment and fluids encouraged, as appropriate. Will continue to monitor for additional needs and changes in skin breakdown.        Problem: Pain:  Goal: Control of acute pain  Description: Control of acute pain  7/17/2021 0334 by Shay De Leon RN  Outcome: Ongoing     Problem: Coping - Ineffective, Individual:  Goal: Ability to identify and develop effective coping behavior will improve  Description: Ability to identify and develop effective coping behavior will improve  7/17/2021 0334 by Shay De Leon RN  Outcome: Ongoing

## 2021-07-17 NOTE — CARE COORDINATION
ONGOING DISCHARGE PLAN:    Patient is alert and oriented x4. Spoke with patient regarding discharge plan and patient confirms that plan is still home with family. She states she plans to attend NA meetings upon discharge. Pt became tearful during conversation. She states \"I was clean for 3 years and now that's all wasted. \"  Emotional support provided. Active order for IV Cipro and IV Vanco for right arm cellulitis. Will continue to follow for additional discharge needs.     Electronically signed by Lawson Castellano RN on 7/17/2021 at 2:32 PM

## 2021-07-17 NOTE — CODE DOCUMENTATION
After 2 minutes of cpr and maual bagging, Patient woke up and sat up in bed. She was very restless and speaking with staff. She denied taking any medications prior to her unresponsiveness. Nothing found in room that she might have taken. Pt denied visitors.

## 2021-07-17 NOTE — CONSULTS
ORTHOPAEDIC CONSULTATION    Reason for consult  Right shoulder pain    HPI / Chief Complaint  Edd Neff is a 47 y.o. old female who presents for originally right shoulder pain. Patient was admitted after heroin overdose. This occurred couple days ago. She states that several days ago she slept on her arm the wrong way and it was painful but she says today she usually not have any pain in her shoulder per se. She does have evidence of some cellulitis in the arm from an injection site but states that the shoulder pain she was having before is now no longer present    Past Medical History  Deandra  has a past medical history of Anxiety, Depression, and Subclinical hypothyroidism. Past Surgical History  Deandra  has a past surgical history that includes Tubal ligation. Current Medications  Reviewed. See EMR for details. Allergies  Allergies have been reviewed. Deandra has No Known Allergies. Social History  Deandra  reports that she has been smoking cigarettes. She has a 7.50 pack-year smoking history. She has never used smokeless tobacco. She reports that she does not drink alcohol and does not use drugs. Family History  Jazmyn's family history includes Cancer in her maternal grandfather; Diabetes in her father; Heart Disease in her maternal grandmother; High Blood Pressure in her father. Review of Systems   History obtained from the patient. Constitution: no fever or chills  Musculoskeletal: As noted in the HPI   Neurologic: pain    Physical Exam  BP (!) 146/119   Pulse 81   Temp 97.7 °F (36.5 °C) (Oral)   Resp 18   Ht 5' 4\" (1.626 m)   Wt 155 lb 13.8 oz (70.7 kg)   SpO2 98%   BMI 26.75 kg/m²   General Appearance: alert, well appearing, and in no distress  Mental Status: alert, oriented to person, place, and time  Examination the patient's right upper seminotes that she does have some cellulitis in the mid arm above her elbow. There is no open wound however.   She has some achiness in the arm upon moving but she has no pain complaints around the shoulder area itself. She is nontender around the acromioclavicular joint or the upper glenohumeral joint. Imaging Studies  No shoulder x-rays ordered    Diagnostics and Labs  Relevant diagnostic, laboratory and radiological studies have been reviewed in the Electronic Medical Record. Impression and Plan  Julio Araujo is a 47 y.o. old female with admission of heroin overdose. Patient does not appear to have any acute problems with her shoulder per se at this time. She did state that several days ago she had some week pain here after sleeping on the wrong way but states today that she is has no shoulder pain at all. She does have some achiness in the arm below this area but this is related to the area of cellulitis for which she is receiving antibiotics. At this point I do feel there is no obvious orthopedic intervention necessary. Patient develops symptoms or problems in the future she contact me or my partner Dr. Abdon Toscano who is a shoulder specialist.          This note is created with the assistance of a speech recognition program.  While intending to generate adocument that actually reflects the content of the visit, the document can still have some errors including those of syntax and sound a like substitutions which may escape proof reading. It such instances, actual meaningcan be extrapolated by contextual diversion.

## 2021-07-17 NOTE — H&P
[facility]   CONSULT NOTE:      DATE OF CONSULT 7/17/2021    REASON FOR CONSULTATION:  Hypoxemia      PCP MIRZA JERONIMO, MAGDA     CHIEF COMPLAINT:  Heroin overdose    HISTORY OF PRESENT ILLNESS:   Kaiser Leavitt is a 8year-old white female who presented to the ER yesterday after being found unresponsive by EMS. She was doing heroin with her friend became unresponsive. Her boyfriend was there called EMS and the patient was given 10 mg of Narcan total with responsiveness. The patient subsequently was admitted via the ER. The ER, she was also found to have a low blood sugar of 33. Was noted to have swelling and induration over her right elbow and was being treated for cellulitis. Around 1 PM day, patient became unresponsive and a CODE BLUE was called. After 2 minutes of CPR and manual bagging the patient woke up and sat up in bed. However, the patient was having trouble staying awake. Her pupils were found to pinpoint and nonresponsive. Narcan was once again given. She became hypoxic with oxygen saturation 45% on room air. She was placed on 4 L nasal cannula and oxygen saturation did increase to 93%. Patient was actually increased up to 5 L and saturations were maintained around 92%. The nurses then found some \"white powder\" in the patient's room. No other syringes etc. were found. Currently, the patient is alert her oxygen saturation is 97%. She denies any cough, fevers, chills or chest pain. She denies any pleurisy.       ALLERGIES:  No Known Allergies    HOME MEDICATIONS:  Medications Prior to Admission: levothyroxine (SYNTHROID) 25 MCG tablet, TAKE ONE TABLET BY MOUTH DAILY  sertraline (ZOLOFT) 25 MG tablet, Take 1 tablet by mouth daily      PAST MEDICAL HISTORY:  Past Medical History:   Diagnosis Date    Anxiety 7/30/2019    Depression     Subclinical hypothyroidism 7/15/2014       PAST SURGICAL HISTORY:  Past Surgical History:   Procedure Laterality Date    TUBAL LIGATION            SOCIAL HISTORY:  Social History     Socioeconomic History    Marital status:      Spouse name: Not on file    Number of children: Not on file    Years of education: Not on file    Highest education level: Not on file   Occupational History    Not on file   Tobacco Use    Smoking status: Current Every Day Smoker     Packs/day: 0.50     Years: 15.00     Pack years: 7.50     Types: Cigarettes    Smokeless tobacco: Never Used   Vaping Use    Vaping Use: Unknown   Substance and Sexual Activity    Alcohol use: No     Alcohol/week: 0.0 standard drinks    Drug use: No    Sexual activity: Not on file   Other Topics Concern    Not on file   Social History Narrative    Not on file     Social Determinants of Health     Financial Resource Strain: Unknown    Difficulty of Paying Living Expenses: Patient refused   Food Insecurity: Unknown    Worried About Running Out of Food in the Last Year: Patient refused    Ran Out of Food in the Last Year: Patient refused   Transportation Needs: Unknown    Lack of Transportation (Medical): Patient refused    Lack of Transportation (Non-Medical): Patient refused   Physical Activity:     Days of Exercise per Week:     Minutes of Exercise per Session:    Stress:     Feeling of Stress :    Social Connections:     Frequency of Communication with Friends and Family:     Frequency of Social Gatherings with Friends and Family:     Attends Church Services:     Active Member of Clubs or Organizations:     Attends Club or Organization Meetings:     Marital Status:    Intimate Partner Violence:     Fear of Current or Ex-Partner:     Emotionally Abused:     Physically Abused:     Sexually Abused:        FAMILY HISTORY:  Family History   Problem Relation Age of Onset    Diabetes Father     High Blood Pressure Father     Heart Disease Maternal Grandmother     Cancer Maternal Grandfather        REVIEW OF SYSTEMS:  All other systems reviewed and are negative. PHYSICAL EXAM:  Vital Signs Blood pressure (!) 137/90, pulse 80, temperature 97.9 °F (36.6 °C), temperature source Oral, resp. rate 19, height 5' 4\" (1.626 m), weight 155 lb 13.8 oz (70.7 kg), SpO2 95 %. Oxygen Amount and Delivery:      Admission Weight Weight: 180 lb (81.6 kg)    General Appearance   Middle-aged female in no acute respiratory distress  Head  Normocephalic, without obvious abnormality, atraumatic    Eyes  conjunctivae/corneas clear. PERRL, EOM's intact. Fundi benign. ENT cavity clear  Neck  no adenopathy, no carotid bruit, no JVD, supple, symmetrical, trachea midline and thyroid not enlarged, symmetric, no tenderness/mass/nodules  Lungs clear  Heart: regular rate and rhythm, S1, S2 normal, no murmur, click, rub or gallop  Abdomen  soft, non-tender; bowel sounds normal; no masses,  no organomegaly  Extremities  Right elbow and arm with evidence of cellulitis  Skin  Skin color, texture, turgor normal. No rashes or lesions  Neurologic: Alert and oriented X 3, normal strength and tone.          Imaging      Lab Review  CBC     Lab Results   Component Value Date    WBC 4.5 07/17/2021    RBC 3.51 07/17/2021    HGB 10.5 07/17/2021    HCT 32.5 07/17/2021     07/17/2021    MCV 92.6 07/17/2021    MCH 30.0 07/17/2021    MCHC 32.4 07/17/2021    RDW 15.0 07/17/2021    LYMPHOPCT 5 07/15/2021    MONOPCT 11 07/15/2021    BASOPCT 1 07/15/2021    MONOSABS 1.50 07/15/2021    LYMPHSABS 0.70 07/15/2021    EOSABS 0.00 07/15/2021    BASOSABS 0.10 07/15/2021    DIFFTYPE NOT REPORTED 07/15/2021       BMP   Lab Results   Component Value Date     07/17/2021    K 3.6 07/17/2021     07/17/2021    CO2 20 07/17/2021    BUN 4 07/17/2021    CREATININE 0.68 07/17/2021    GLUCOSE 83 07/17/2021    CALCIUM 8.3 07/17/2021       LFTS  Lab Results   Component Value Date    ALKPHOS 72 07/15/2021    ALT 62 07/15/2021    AST 62 07/15/2021    PROT 8.6 07/15/2021    BILITOT 0.25 07/15/2021    LABALBU 4.6 07/15/2021       INR  No results for input(s): PROTIME, INR in the last 72 hours. APTT  No results for input(s): APTT in the last 72 hours. Lactic Acid  No results found for: LACTA     PRO-BNP   No results for input(s): PROBNP in the last 72 hours. ABGs: No results found for: PHART, PO2ART, KSU3PBA    No results found for: IFIO2, MODE, SETTIDVOL, SETPEEP      Impression    Heroin overdose  Hypoxemia secondary to continued drug use while in hospital and hypoventilation  History of tobacco use   Right upper extremity cellulitis  Hypoglycemia secondary to poor nutrition    Plan:      Monitor closely for signs of continued drug use while in the hospital  Continue IV antibiotics, currently on vancomycin and Cipro  Would place on continuous pulse oximetry, this might serve as a deterrent from her using  Continue nicotine patch   Watch for signs of heroin withdrawal which include tachycardia, nausea and pain.   Care is supportive  DVT prophylaxis  Will need polysubstance abuse counseling

## 2021-07-17 NOTE — FLOWSHEET NOTE
Patient was anxious, restless, and crying as she shared that she had been clean for 3 years after using for 5 years. She said she had been drinking out celebrating her birthday and decided using \"a little\" would be ok. She spoke of \"shame and stupidity\" that she felt. She lives with ex , ex MIL, and one of their 2 daughters. She said she has good support and they will help her stay clean. Writer provided listening presence and prayer. 07/17/21 1215   Encounter Summary   Services provided to: Patient   Referral/Consult From: [address] [place] [address] Children;Family members   Continue Visiting   (7-17-21)   Complexity of Encounter Moderate   Length of Encounter 15 minutes   Spiritual/Worship   Type Spiritual support   Assessment Approachable; Anxious; Shame   Intervention Active listening;Explored feelings, thoughts, concerns;Prayer;Sustaining presence/ Ministry of presence;Nurtured hope;Discussed illness/injury and it's impact; Discussed meaning/purpose   Outcome Expressed gratitude;Engaged in conversation;Expressed feelings/needs/concerns;Receptive;Venting emotion

## 2021-07-17 NOTE — PROGRESS NOTES
Spoke with dr Regla Thorne regarding pt O2 stats still struggling post code/cpr. Reviewed pt was 96% on RA prior, now on 5L NC and at 92%. 84% on 3L NC. He is placing orders for a chest xray and pulmonary consult.

## 2021-07-17 NOTE — PLAN OF CARE
Problem: Falls - Risk of:  Goal: Will remain free from falls  Description: Will remain free from falls  7/17/2021 1701 by Khadar Adhikari RN  Outcome: Met This Shift       Problem: Falls - Risk of:  Goal: Absence of physical injury  Description: Absence of physical injury  7/17/2021 1706  Outcome: Ongoing  Note: No falls noted this shift. Bed kept in low position. Safe environment maintained. Bedside table & call light in reach. Uses call light appropriately when needing assistance.

## 2021-07-17 NOTE — PROGRESS NOTES
Pharmacy Vancomycin Consult     Vancomycin Day: 2  Current Dosin mg every 12 hours   Current indication: right upper extremity cellulitis     Temp max:  98.2    Recent Labs     21  0541 21  0539 21  0731   BUN 7 4*  --    CREATININE 0.86 0.68  --    WBC 6.5  --  4.5       Intake/Output Summary (Last 24 hours) at 2021 1555  Last data filed at 2021 0801  Gross per 24 hour   Intake 5070 ml   Output --   Net 5070 ml     Culture Date      Source                       Results  See micro     Ht Readings from Last 1 Encounters:   07/15/21 5' 4\" (1.626 m)        Wt Readings from Last 1 Encounters:   21 155 lb 13.8 oz (70.7 kg)       Body mass index is 26.75 kg/m². Estimated Creatinine Clearance: 108 mL/min (based on SCr of 0.68 mg/dL). Trough: 15.7 (goal 10-20)    Assessment/Plan:  Renal function continues to improve. Trough is therapeutic at 15.7 (goal 10-20). Will continue with current dosing of 1000 mg every 12 hours.      Edwin Streeter, PharmD, [facility]  2021 3:58 PM

## 2021-07-17 NOTE — PROGRESS NOTES
Pt declining family to be updated at this time.  She stated she would talk to them when she is ready

## 2021-07-17 NOTE — FLOWSHEET NOTE
Writer responded to code blue and updated staff on recent visit.      07/17/21 1259   Encounter Summary   Services provided to: Patient not available   Referral/Consult From: Multi-disciplinary team   Continue Visiting   (7-17-21)   Complexity of Encounter High   Length of Encounter 15 minutes   Crisis   Type Rapid response; Code   Intervention Prayer

## 2021-07-18 LAB
ANION GAP SERPL CALCULATED.3IONS-SCNC: 9 MMOL/L (ref 9–17)
BUN BLDV-MCNC: 3 MG/DL (ref 6–20)
BUN/CREAT BLD: ABNORMAL (ref 9–20)
CALCIUM SERPL-MCNC: 8.1 MG/DL (ref 8.6–10.4)
CHLORIDE BLD-SCNC: 104 MMOL/L (ref 98–107)
CO2: 21 MMOL/L (ref 20–31)
CREAT SERPL-MCNC: 0.69 MG/DL (ref 0.5–0.9)
GFR AFRICAN AMERICAN: >60 ML/MIN
GFR NON-AFRICAN AMERICAN: >60 ML/MIN
GFR SERPL CREATININE-BSD FRML MDRD: ABNORMAL ML/MIN/{1.73_M2}
GFR SERPL CREATININE-BSD FRML MDRD: ABNORMAL ML/MIN/{1.73_M2}
GLUCOSE BLD-MCNC: 106 MG/DL (ref 65–105)
GLUCOSE BLD-MCNC: 62 MG/DL (ref 65–105)
GLUCOSE BLD-MCNC: 73 MG/DL (ref 65–105)
GLUCOSE BLD-MCNC: 87 MG/DL (ref 65–105)
GLUCOSE BLD-MCNC: 93 MG/DL (ref 70–99)
HCT VFR BLD CALC: 33.5 % (ref 36–46)
HEMOGLOBIN: 11.1 G/DL (ref 12–16)
MAGNESIUM: 1.7 MG/DL (ref 1.6–2.6)
MCH RBC QN AUTO: 29.6 PG (ref 26–34)
MCHC RBC AUTO-ENTMCNC: 33.2 G/DL (ref 31–37)
MCV RBC AUTO: 89.2 FL (ref 80–100)
NRBC AUTOMATED: ABNORMAL PER 100 WBC
PDW BLD-RTO: 14.9 % (ref 11.5–14.9)
PLATELET # BLD: 185 K/UL (ref 150–450)
PMV BLD AUTO: 8.4 FL (ref 6–12)
POTASSIUM SERPL-SCNC: 3.5 MMOL/L (ref 3.7–5.3)
RBC # BLD: 3.76 M/UL (ref 4–5.2)
SODIUM BLD-SCNC: 134 MMOL/L (ref 135–144)
WBC # BLD: 3.5 K/UL (ref 3.5–11)

## 2021-07-18 PROCEDURE — 85027 COMPLETE CBC AUTOMATED: CPT

## 2021-07-18 PROCEDURE — 6370000000 HC RX 637 (ALT 250 FOR IP): Performed by: INTERNAL MEDICINE

## 2021-07-18 PROCEDURE — 2500000003 HC RX 250 WO HCPCS: Performed by: NURSE PRACTITIONER

## 2021-07-18 PROCEDURE — 2580000003 HC RX 258: Performed by: INTERNAL MEDICINE

## 2021-07-18 PROCEDURE — 82947 ASSAY GLUCOSE BLOOD QUANT: CPT

## 2021-07-18 PROCEDURE — 6360000002 HC RX W HCPCS: Performed by: INTERNAL MEDICINE

## 2021-07-18 PROCEDURE — 2060000000 HC ICU INTERMEDIATE R&B

## 2021-07-18 PROCEDURE — 80048 BASIC METABOLIC PNL TOTAL CA: CPT

## 2021-07-18 PROCEDURE — 6370000000 HC RX 637 (ALT 250 FOR IP): Performed by: NURSE PRACTITIONER

## 2021-07-18 PROCEDURE — 6360000002 HC RX W HCPCS: Performed by: NURSE PRACTITIONER

## 2021-07-18 PROCEDURE — 83735 ASSAY OF MAGNESIUM: CPT

## 2021-07-18 PROCEDURE — 2580000003 HC RX 258: Performed by: NURSE PRACTITIONER

## 2021-07-18 PROCEDURE — 99232 SBSQ HOSP IP/OBS MODERATE 35: CPT | Performed by: INTERNAL MEDICINE

## 2021-07-18 PROCEDURE — 36415 COLL VENOUS BLD VENIPUNCTURE: CPT

## 2021-07-18 RX ADMIN — CLONIDINE HYDROCHLORIDE 0.1 MG: 0.1 TABLET ORAL at 21:15

## 2021-07-18 RX ADMIN — CLONIDINE HYDROCHLORIDE 0.1 MG: 0.1 TABLET ORAL at 08:29

## 2021-07-18 RX ADMIN — VANCOMYCIN HYDROCHLORIDE 1000 MG: 1 INJECTION, POWDER, LYOPHILIZED, FOR SOLUTION INTRAVENOUS at 02:42

## 2021-07-18 RX ADMIN — SODIUM CHLORIDE: 9 INJECTION, SOLUTION INTRAVENOUS at 06:12

## 2021-07-18 RX ADMIN — VANCOMYCIN HYDROCHLORIDE 1000 MG: 1 INJECTION, POWDER, LYOPHILIZED, FOR SOLUTION INTRAVENOUS at 15:10

## 2021-07-18 RX ADMIN — CIPROFLOXACIN 400 MG: 2 INJECTION, SOLUTION INTRAVENOUS at 01:40

## 2021-07-18 RX ADMIN — SODIUM CHLORIDE: 9 INJECTION, SOLUTION INTRAVENOUS at 21:28

## 2021-07-18 RX ADMIN — SODIUM CHLORIDE: 9 INJECTION, SOLUTION INTRAVENOUS at 12:02

## 2021-07-18 RX ADMIN — SODIUM CHLORIDE, PRESERVATIVE FREE 10 ML: 5 INJECTION INTRAVENOUS at 21:19

## 2021-07-18 RX ADMIN — CLONIDINE HYDROCHLORIDE 0.1 MG: 0.1 TABLET ORAL at 13:14

## 2021-07-18 RX ADMIN — POTASSIUM CHLORIDE 40 MEQ: 1500 TABLET, EXTENDED RELEASE ORAL at 13:14

## 2021-07-18 RX ADMIN — ENOXAPARIN SODIUM 40 MG: 40 INJECTION SUBCUTANEOUS at 08:30

## 2021-07-18 RX ADMIN — CIPROFLOXACIN 400 MG: 2 INJECTION, SOLUTION INTRAVENOUS at 13:14

## 2021-07-18 RX ADMIN — FAMOTIDINE 20 MG: 10 INJECTION, SOLUTION INTRAVENOUS at 21:15

## 2021-07-18 RX ADMIN — FAMOTIDINE 20 MG: 10 INJECTION, SOLUTION INTRAVENOUS at 08:29

## 2021-07-18 ASSESSMENT — PAIN SCALES - GENERAL
PAINLEVEL_OUTOF10: 0

## 2021-07-18 NOTE — PLAN OF CARE
Problem: Falls - Risk of:  Goal: Will remain free from falls  Description: Will remain free from falls  7/18/2021 0342 by Nathalie Bauer RN  Note: Pt assessed as a fall risk this shift. Remains free from falls and accidental injury at this time. Fall precautions in place, including falling star sign and fall risk band on pt. Floor free from obstacles, and bed is locked and in lowest position. Adequate lighting provided. Pt encouraged to call before getting OOB for any need. Bed alarm activated. Will continue to monitor needs during hourly rounding, and reinforce education on use of call light. Problem: Pain:  Goal: Pain level will decrease  Description: Pain level will decrease  Outcome: Ongoing  Note: No pain at this time. 0/10 pain scale. Problem: Coping - Ineffective, Individual:  Goal: Ability to identify and develop effective coping behavior will improve  Description: Ability to identify and develop effective coping behavior will improve  7/18/2021 0342 by Nathalie Bauer RN  Outcome: Ongoing     Problem: Skin Integrity:  Goal: Will show no infection signs and symptoms  Description: Will show no infection signs and symptoms  Note: Skin assessment complete. Sens[employer]are applied PRN. Turned and repositioned PRN Per patient  Area kept free from moisture. Proper nourishment and fluids encouraged, as appropriate. Will continue to monitor for additional needs and changes in skin breakdown.

## 2021-07-18 NOTE — PROGRESS NOTES
[facility] Internal Medicine    Progress Note     7/18/2021    1:00 PM    Name:   Nadine Gama  MRN:     [id number]     Acct:      [de-identified]   Room:   2088/2088-01  IP Day:  2  Admit Date:  7/15/2021  6:30 PM    PCP:   Cayla JERONIMO PA-C  Code Status:  Full Code    Subjective:     C/C:   Chief Complaint   Patient presents with    Drug Overdose     Principal Problem:    Heroin overdose, accidental or unintentional, initial encounter ([facility] [address]  Active Problems:    Depression    Tobacco abuse    Anxiety    History of hypothyroidism    Rhabdomyolysis    Hypoglycemia    Cellulitis of right upper extremity  Resolved Problems:    * No resolved hospital problems. *    7/18/21    · Patient drug overdose on the floor 0n 7/17/21 1. Rapid was called  2. Unresponsive pupils were narrow and he had to be given Narcan and subsequently responded  3. Pulmonary was consulted as she was hypoxic  4. She claims she does not know how she did it and she found pale and back    Will consult psych   Her cellulitis is improving            7/17/21   Patient is going through withdrawal blood pressure is elevated  Added clonidine  We will continue to observe  Once medically stable patient will be transferred to [facility]  Psychiatry has accepted him for    Regarding the shoulder problem Dr. Maira Tatum consult noted  No significant issue with her shoulder  Ortho input  Nadine Gama is a 47 y.o. old female with admission of heroin overdose. Patient does not appear to have any acute problems with her shoulder per se at this time. She did state that several days ago she had some week pain here after sleeping on the wrong way but states today that she is has no shoulder pain at all. She does have some achiness in the arm below this area but this is related to the area of cellulitis for which she is receiving antibiotics. At this point I do feel there is no obvious orthopedic intervention necessary.   Patient develops symptoms or problems in the future she contact me or my partner Dr. Lillian Rock who is a shoulder specialist.         On admission      The patient is a 47 y.o.  female, with a history of hypothyroidism, depression, anxiety, tobacco use, and heroin abuse. Patient presents with altered mental status after heroin overdose. Patient states she is been clean off heroin for 5 years prior to today. According to EMS report, patient was doing heroin with her friend and was found down at home by her boyfriend. When EMS arrived, a total of 10 mg of Narcan was given and the patient became responsive. Patient denies headache, visual disturbance, chest pain, cough, shortness of breath, abdominal pain, vomiting or back pain. She does also complain and nausea. Patient denies suicidal ideations.   States overdose was unintentional.     HPI   1) Location/Symptom altered mental status, heroin overdose, nausea  2) Timing/Onset: Today     Significant last 24 hr data reviewed ;   Vitals:    07/18/21 0020 07/18/21 0745 07/18/21 0845 07/18/21 0856   BP: (!) 127/96 (!) 147/111     Pulse: 75 85     Resp: 17 16     Temp: 98.6 °F (37 °C) 97.7 °F (36.5 °C)     TempSrc: Oral Oral     SpO2: 97% 90% 93% 94%   Weight: 166 lb 0.1 oz (75.3 kg)      Height:          Recent Results (from the past 24 hour(s))   VANCOMYCIN, TROUGH    Collection Time: 07/17/21  3:04 PM   Result Value Ref Range    Vancomycin Tr 15.7 10.0 - 20.0 ug/mL    Vancomycin Trough Dose amount 1,000     Vancomycin Trough Date last dose 7,172,021     Vancomycin Trough Time last dose 251    POC Glucose Fingerstick    Collection Time: 07/17/21  4:19 PM   Result Value Ref Range    POC Glucose 111 (H) 65 - 105 mg/dL   POC Glucose Fingerstick    Collection Time: 07/17/21  9:43 PM   Result Value Ref Range    POC Glucose 79 65 - 105 mg/dL   Basic Metabolic Panel w/ Reflex to MG    Collection Time: 07/18/21  5:29 AM   Result Value Ref Range    Glucose 93 70 - 99 mg/dL    BUN 3 (L) 6 - 20 mg/dL    CREATININE 0.69 0.50 - 0.90 mg/dL    Bun/Cre Ratio NOT REPORTED 9 - 20    Calcium 8.1 (L) 8.6 - 10.4 mg/dL    Sodium 134 (L) 135 - 144 mmol/L    Potassium 3.5 (L) 3.7 - 5.3 mmol/L    Chloride 104 98 - 107 mmol/L    CO2 21 20 - 31 mmol/L    Anion Gap 9 9 - 17 mmol/L    GFR Non-African American >60 >60 mL/min    GFR African American >60 >60 mL/min    GFR Comment          GFR Staging NOT REPORTED    CBC    Collection Time: 07/18/21  5:29 AM   Result Value Ref Range    WBC 3.5 3.5 - 11.0 k/uL    RBC 3.76 (L) 4.0 - 5.2 m/uL    Hemoglobin 11.1 (L) 12.0 - 16.0 g/dL    Hematocrit 33.5 (L) 36 - 46 %    MCV 89.2 80 - 100 fL    MCH 29.6 26 - 34 pg    MCHC 33.2 31 - 37 g/dL    RDW 14.9 11.5 - 14.9 %    Platelets 106 768 - 980 k/uL    MPV 8.4 6.0 - 12.0 fL    NRBC Automated NOT REPORTED per 100 WBC   Magnesium    Collection Time: 07/18/21  5:29 AM   Result Value Ref Range    Magnesium 1.7 1.6 - 2.6 mg/dL   POC Glucose Fingerstick    Collection Time: 07/18/21  7:10 AM   Result Value Ref Range    POC Glucose 73 65 - 105 mg/dL   POC Glucose Fingerstick    Collection Time: 07/18/21 11:56 AM   Result Value Ref Range    POC Glucose 62 (L) 65 - 105 mg/dL     Recent Labs     07/17/21  1123 07/17/21  1619 07/17/21  2143 07/18/21  0710 07/18/21  1156   POCGLU 70 111* 79 73 62*        XR SHOULDER RIGHT (MIN 2 VIEWS)    Result Date: 7/16/2021  EXAMINATION: THREE XRAY VIEWS OF THE RIGHT SHOULDER 7/16/2021 2:33 pm COMPARISON: None. HISTORY: ORDERING SYSTEM PROVIDED HISTORY: impingement shoulder TECHNOLOGIST PROVIDED HISTORY: impingement shoulder Reason for Exam: pain, no recent injuries. Acuity: Acute Type of Exam: Initial 45-year-old female with right shoulder pain; no recent injuries FINDINGS: Right AC and glenohumeral joints grossly unremarkable. Mild biapical pleural thickening. Cardiac monitor leads overlie the chest.  Visualized ribs appear intact. No acute fracture or dislocation. No acute fracture or dislocation. XR ELBOW RIGHT (MIN 3 VIEWS)    Result Date: 7/16/2021  EXAMINATION: THREE XRAY VIEWS OF THE RIGHT ELBOW 7/16/2021 12:11 am COMPARISON: None. HISTORY: ORDERING SYSTEM PROVIDED HISTORY: pain swelling red r/o foriegn body TECHNOLOGIST PROVIDED HISTORY: pain swelling red r/o foriegn body Reason for Exam: pain swelling red r/o foriegn body Acuity: Acute Type of Exam: Initial Additional signs and symptoms: pain swelling red r/o foriegn body Relevant Medical/Surgical History: pain swelling red r/o foriegn body FINDINGS: There is no fracture, dislocation or joint abnormality evident. Bone density is normal.  Soft tissue swelling is present anteriorly predominantly superior to the antecubital fossa. Volar soft tissue swelling. No osseous abnormality. CT Head WO Contrast    Result Date: 7/15/2021  EXAMINATION: CT OF THE HEAD WITHOUT CONTRAST  7/15/2021 6:25 pm TECHNIQUE: CT of the head was performed without the administration of intravenous contrast. Dose modulation, iterative reconstruction, and/or weight based adjustment of the mA/kV was utilized to reduce the radiation dose to as low as reasonably achievable. COMPARISON: None. HISTORY: ORDERING SYSTEM PROVIDED HISTORY: altered mentation TECHNOLOGIST PROVIDED HISTORY: altered mentation Decision Support Exception - unselect if not a suspected or confirmed emergency medical condition->Emergency Medical Condition (MA) Is the patient pregnant?->No Reason for Exam: Altered mental status Acuity: Acute Type of Exam: Initial FINDINGS: BRAIN/VENTRICLES: There is no acute intracranial hemorrhage, mass effect or midline shift. No abnormal extra-axial fluid collection. The gray-white differentiation is maintained without evidence of an acute infarct. There is no evidence of hydrocephalus. ORBITS: The visualized portion of the orbits demonstrate no acute abnormality. SINUSES: The visualized paranasal sinuses and mastoid air cells demonstrate no acute abnormality.  SOFT TISSUES/SKULL:  No acute abnormality of the visualized skull or soft tissues. No acute intracranial abnormality. XR CHEST PORTABLE    Result Date: 7/15/2021  EXAMINATION: ONE XRAY VIEW OF THE CHEST 7/15/2021 7:34 pm COMPARISON: None. HISTORY: ORDERING SYSTEM PROVIDED HISTORY: altered, given naloxone TECHNOLOGIST PROVIDED HISTORY: altered, given naloxone Reason for Exam: altered, given naloxone Acuity: Acute Type of Exam: Initial Additional signs and symptoms: altered, given naloxone Relevant Medical/Surgical History: altered, given naloxone 29-year-old female with altered mental status; given Naloxone FINDINGS: AP portable upright view of the chest. Trachea midline. Cardiac and mediastinal contours within normal limits. No pneumothorax. No free air. No acute focal airspace consolidation or pleural effusions. No acute osseous abnormality evident. No acute focal airspace consolidation             HPI:         Review of Systems:     Constitutional:  negative for chills, fevers, sweats  Respiratory:  negative for cough, dyspnea on exertion, hemoptysis, shortness of breath, wheezing  Cardiovascular:  negative for chest pain, chest pressure/discomfort, lower extremity edema, palpitations  Gastrointestinal:  negative for abdominal pain, constipation, diarrhea, nausea, vomiting  Neurological:  negative for dizziness, headache  Data:     Past Medical History:  no change     Social History:  no change    Family History: @no change    Vitals:      I/O (24Hr):     Intake/Output Summary (Last 24 hours) at 7/18/2021 1300  Last data filed at 7/18/2021 1154  Gross per 24 hour   Intake 5950 ml   Output 1550 ml   Net 4400 ml       Labs:    URINE ANALYSIS: No results found for: LABURIN     CBC:  Lab Results   Component Value Date    WBC 3.5 07/18/2021    HGB 11.1 07/18/2021     07/18/2021        BMP:    Lab Results   Component Value Date     07/18/2021    K 3.5 07/18/2021     07/18/2021    CO2 21 2021    BUN 3 2021    CREATININE 0.69 2021    GLUCOSE 93 2021      LIVER PROFILE:  Lab Results   Component Value Date    ALT 62 07/15/2021    AST 62 07/15/2021    PROT 8.6 07/15/2021    BILITOT 0.25 07/15/2021    LABALBU 4.6 07/15/2021               Radiology:  Medications:      Allergies:      Current Meds:   Scheduled Meds:    cloNIDine  0.1 mg Oral TID    [Held by provider] levothyroxine  25 mcg Oral Daily    [Held by provider] sertraline  25 mg Oral Daily    sodium chloride flush  5-40 mL Intravenous 2 times per day    enoxaparin  40 mg Subcutaneous Daily    famotidine (PEPCID) injection  20 mg Intravenous BID    nicotine  1 patch Transdermal Daily    vancomycin (VANCOCIN) intermittent dosing (placeholder)   Other RX Placeholder    vancomycin  1,000 mg Intravenous Q12H    ciprofloxacin  400 mg Intravenous Q12H     Continuous Infusions:    sodium chloride      sodium chloride 150 mL/hr at 21 1202    dextrose       PRN Meds: naloxone, sodium chloride flush, sodium chloride, potassium chloride **OR** potassium alternative oral replacement **OR** potassium chloride, magnesium sulfate, polyethylene glycol, acetaminophen **OR** acetaminophen, ondansetron, promethazine (PHENERGAN) in sodium chloride 0.9% IVPB, glucose, dextrose, glucagon (rDNA), dextrose, ondansetron      Physical Examination:        BP (!) 147/111   Pulse 85   Temp 97.7 °F (36.5 °C) (Oral)   Resp 16   Ht 5' 4\" (1.626 m)   Wt 166 lb 0.1 oz (75.3 kg)   SpO2 94%   BMI 28.49 kg/m²   Temp (24hrs), Av °F (36.7 °C), Min:97.7 °F (36.5 °C), Max:98.6 °F (37 °C)    Recent Labs     21  1619 21  2143 21  0710 21  1156   POCGLU 111* 79 73 62*       Intake/Output Summary (Last 24 hours) at 2021 1300  Last data filed at 2021 1154  Gross per 24 hour   Intake 5950 ml   Output 1550 ml   Net 4400 ml       General Appearance:  alert, well appearing, and in no acute distress  Mental status:   Head:  normocephalic, atraumatic. Eye: no icterus, redness, pupils equal and reactive, extraocular eye movements intact, conjunctiva clear  Ear: normal external ear, no discharge, hearing intact  Nose:  no drainage noted  Mouth: mucous membranes moist  Neck: supple, no carotid bruits, thyroid not palpable  Lungs: Bilateral equal air entry, clear to ausculation, no wheezing, rales or rhonchi, normal effort  Cardiovascular: normal rate, regular rhythm, no murmur, gallop, rub. Abdomen: Soft, nontender, nondistended, normal bowel sounds, no hepatomegaly or splenomegaly  Neurologic: There are no new focal motor or sensory deficits,   Skin:   Cellulitis rt arm        Assessment:        Primary Problem  Heroin overdose, accidental or unintentional, initial encounter [facility])    Active Hospital Problems    Diagnosis Date Noted    Cellulitis of right upper extremity [L03.113] 07/16/2021    Heroin overdose, accidental or unintentional, initial encounter ([facility] [address] [T40.1X1A] 07/15/2021    Rhabdomyolysis [M62.82] 07/15/2021    Hypoglycemia [E16.2] 07/15/2021    Anxiety [F41.9] 07/30/2019    History of hypothyroidism [Z86.39] 07/30/2019    Depression [F32.9] 03/18/2014    Tobacco abuse [Z72.0] 03/18/2014     Plan:        1. Add clonidine for bp and withdrawal .  No new complaints/symptoms . Symptoms or ailment  course ;                                   are improving over time. 2. Continue present regimen                 7/18/21    · Patient drug overdose on the floor 0n 7/17/21 5. Rapid was called  6. Unresponsive pupils were narrow and he had to be given Narcan and subsequently responded  7. Pulmonary was consulted as she was hypoxic  8.  She claims she does not know how she did it and she found pale and back    Will consult psych   Her cellulitis is improving              Eliazar Corado MD  7/18/2021  1:00 PM

## 2021-07-18 NOTE — PLAN OF CARE
Problem: Falls - Risk of:  Goal: Will remain free from falls  Description: Will remain free from falls  7/18/2021 1546 by Earline Lynn RN  Outcome: Ongoing  Note: The patient remained free from falls this shift, call light within reach, bed in locked and lowest position. Side rails up x2. Continue to monitor closely. Problem: Skin Integrity:  Goal: Will show no infection signs and symptoms  Description: Will show no infection signs and symptoms  7/18/2021 1546 by Earline Lynn RN  Note: Pt shows no signs or symptoms of infection at this time. VS stable, alert and oriented x4. Hand hygiene utilized upon entry and exit. Will continue to monitor. Problem: Skin Integrity:  Goal: Absence of new skin breakdown  Description: Absence of new skin breakdown  Outcome: Ongoing     Problem: Pain:  Goal: Pain level will decrease  Description: Pain level will decrease  7/18/2021 1546 by Earline Lynn RN  Outcome: Ongoing  Note: Patient able to verbalize needs, patient denies pain this shift. Will continue to monitor. Problem: Health Maintenance - Impaired:  Goal: Ability to manage health-related needs will improve  Description: Ability to manage health-related needs will improve  Outcome: Ongoing     Problem: Mood - Altered:  Goal: Mood stable  Description: Mood stable  Outcome: Ongoing  Note: Patients mood seems appropriate for age, calm demeanor, Will continue to monitor.

## 2021-07-18 NOTE — FLOWSHEET NOTE
Patient spoke about the breathing difficulties she's having and not understanding why she's experiencing SOB. She is anxious to get back home and \"get back on track. \" Patient seemed calmer today and less emotional today than during yesterday's visit. She expressed appreciation for writer's follow up today and blessing. 07/18/21 1022   Encounter Summary   Services provided to: Patient   Referral/Consult From: [facility]   (7-18-21)   Complexity of Encounter Moderate   Length of Encounter 15 minutes   Spiritual/Rastafarian   Type Spiritual support   Assessment Approachable;Tearful   Intervention Active listening;Explored feelings, thoughts, concerns;Prayer;Explored coping resources;Sustaining presence/ Ministry of presence; Discussed illness/injury and it's impact   Outcome Expressed gratitude;Engaged in conversation;Expressed feelings/needs/concerns;Receptive

## 2021-07-18 NOTE — CARE COORDINATION
ONGOING DISCHARGE PLAN:    Spoke with patient yesterday regarding discharge plan, and she confirmed that plan is home with family. No need for VNS. She states she will be going to NA meetings upon discharge. Right arm cellulitis. On IV Cipro and IV Vanco.    Will continue to follow for additional discharge needs.     Electronically signed by Meghan Armenta RN on 7/18/2021 at 10:39 AM

## 2021-07-19 ENCOUNTER — APPOINTMENT (OUTPATIENT)
Dept: CT IMAGING | Age: 41
DRG: 816 | End: 2021-07-19
Payer: MEDICARE

## 2021-07-19 LAB
ANION GAP SERPL CALCULATED.3IONS-SCNC: 10 MMOL/L (ref 9–17)
BUN BLDV-MCNC: 4 MG/DL (ref 6–20)
BUN/CREAT BLD: ABNORMAL (ref 9–20)
CALCIUM SERPL-MCNC: 8.3 MG/DL (ref 8.6–10.4)
CHLORIDE BLD-SCNC: 104 MMOL/L (ref 98–107)
CO2: 20 MMOL/L (ref 20–31)
CREAT SERPL-MCNC: 0.6 MG/DL (ref 0.5–0.9)
GFR AFRICAN AMERICAN: >60 ML/MIN
GFR NON-AFRICAN AMERICAN: >60 ML/MIN
GFR SERPL CREATININE-BSD FRML MDRD: ABNORMAL ML/MIN/{1.73_M2}
GFR SERPL CREATININE-BSD FRML MDRD: ABNORMAL ML/MIN/{1.73_M2}
GLUCOSE BLD-MCNC: 155 MG/DL (ref 65–105)
GLUCOSE BLD-MCNC: 85 MG/DL (ref 65–105)
GLUCOSE BLD-MCNC: 93 MG/DL (ref 65–105)
GLUCOSE BLD-MCNC: 95 MG/DL (ref 70–99)
GLUCOSE BLD-MCNC: 96 MG/DL (ref 65–105)
HCT VFR BLD CALC: 32 % (ref 36–46)
HEMOGLOBIN: 10.9 G/DL (ref 12–16)
HEPATITIS C ANTIBODY: REACTIVE
HIV AG/AB: NONREACTIVE
MCH RBC QN AUTO: 29.4 PG (ref 26–34)
MCHC RBC AUTO-ENTMCNC: 34.2 G/DL (ref 31–37)
MCV RBC AUTO: 86 FL (ref 80–100)
NRBC AUTOMATED: ABNORMAL PER 100 WBC
PDW BLD-RTO: 15.4 % (ref 11.5–14.9)
PLATELET # BLD: 202 K/UL (ref 150–450)
PMV BLD AUTO: 9 FL (ref 6–12)
POTASSIUM SERPL-SCNC: 3.9 MMOL/L (ref 3.7–5.3)
RBC # BLD: 3.72 M/UL (ref 4–5.2)
SODIUM BLD-SCNC: 134 MMOL/L (ref 135–144)
WBC # BLD: 3.8 K/UL (ref 3.5–11)

## 2021-07-19 PROCEDURE — 6370000000 HC RX 637 (ALT 250 FOR IP): Performed by: INTERNAL MEDICINE

## 2021-07-19 PROCEDURE — 2580000003 HC RX 258: Performed by: NURSE PRACTITIONER

## 2021-07-19 PROCEDURE — 90792 PSYCH DIAG EVAL W/MED SRVCS: CPT | Performed by: PSYCHIATRY & NEUROLOGY

## 2021-07-19 PROCEDURE — 87389 HIV-1 AG W/HIV-1&-2 AB AG IA: CPT

## 2021-07-19 PROCEDURE — 6360000002 HC RX W HCPCS: Performed by: INTERNAL MEDICINE

## 2021-07-19 PROCEDURE — 80048 BASIC METABOLIC PNL TOTAL CA: CPT

## 2021-07-19 PROCEDURE — 86803 HEPATITIS C AB TEST: CPT

## 2021-07-19 PROCEDURE — 94761 N-INVAS EAR/PLS OXIMETRY MLT: CPT

## 2021-07-19 PROCEDURE — 82947 ASSAY GLUCOSE BLOOD QUANT: CPT

## 2021-07-19 PROCEDURE — 6360000002 HC RX W HCPCS: Performed by: NURSE PRACTITIONER

## 2021-07-19 PROCEDURE — 99232 SBSQ HOSP IP/OBS MODERATE 35: CPT | Performed by: INTERNAL MEDICINE

## 2021-07-19 PROCEDURE — 85027 COMPLETE CBC AUTOMATED: CPT

## 2021-07-19 PROCEDURE — 2580000003 HC RX 258: Performed by: INTERNAL MEDICINE

## 2021-07-19 PROCEDURE — 6370000000 HC RX 637 (ALT 250 FOR IP): Performed by: NURSE PRACTITIONER

## 2021-07-19 PROCEDURE — 1200000000 HC SEMI PRIVATE

## 2021-07-19 PROCEDURE — 36415 COLL VENOUS BLD VENIPUNCTURE: CPT

## 2021-07-19 PROCEDURE — 2500000003 HC RX 250 WO HCPCS: Performed by: NURSE PRACTITIONER

## 2021-07-19 RX ORDER — IBUPROFEN 600 MG/1
600 TABLET ORAL EVERY 8 HOURS PRN
Status: DISCONTINUED | OUTPATIENT
Start: 2021-07-19 | End: 2021-07-20 | Stop reason: HOSPADM

## 2021-07-19 RX ADMIN — CLONIDINE HYDROCHLORIDE 0.1 MG: 0.1 TABLET ORAL at 09:25

## 2021-07-19 RX ADMIN — IBUPROFEN 600 MG: 600 TABLET, FILM COATED ORAL at 21:04

## 2021-07-19 RX ADMIN — SODIUM CHLORIDE 25 ML: 9 INJECTION, SOLUTION INTRAVENOUS at 21:09

## 2021-07-19 RX ADMIN — ACETAMINOPHEN 650 MG: 325 TABLET ORAL at 15:27

## 2021-07-19 RX ADMIN — FAMOTIDINE 20 MG: 10 INJECTION, SOLUTION INTRAVENOUS at 09:26

## 2021-07-19 RX ADMIN — VANCOMYCIN HYDROCHLORIDE 1000 MG: 1 INJECTION, POWDER, LYOPHILIZED, FOR SOLUTION INTRAVENOUS at 01:48

## 2021-07-19 RX ADMIN — CLONIDINE HYDROCHLORIDE 0.1 MG: 0.1 TABLET ORAL at 21:04

## 2021-07-19 RX ADMIN — ENOXAPARIN SODIUM 40 MG: 40 INJECTION SUBCUTANEOUS at 09:24

## 2021-07-19 RX ADMIN — SODIUM CHLORIDE: 9 INJECTION, SOLUTION INTRAVENOUS at 05:18

## 2021-07-19 RX ADMIN — CIPROFLOXACIN 400 MG: 2 INJECTION, SOLUTION INTRAVENOUS at 16:35

## 2021-07-19 RX ADMIN — VANCOMYCIN HYDROCHLORIDE 1000 MG: 1 INJECTION, POWDER, LYOPHILIZED, FOR SOLUTION INTRAVENOUS at 17:51

## 2021-07-19 RX ADMIN — CLONIDINE HYDROCHLORIDE 0.1 MG: 0.1 TABLET ORAL at 14:21

## 2021-07-19 RX ADMIN — ACETAMINOPHEN 650 MG: 325 TABLET ORAL at 09:25

## 2021-07-19 RX ADMIN — CIPROFLOXACIN 400 MG: 2 INJECTION, SOLUTION INTRAVENOUS at 00:33

## 2021-07-19 ASSESSMENT — PAIN SCALES - GENERAL
PAINLEVEL_OUTOF10: 4
PAINLEVEL_OUTOF10: 5
PAINLEVEL_OUTOF10: 3
PAINLEVEL_OUTOF10: 9
PAINLEVEL_OUTOF10: 2
PAINLEVEL_OUTOF10: 0

## 2021-07-19 NOTE — FLOWSHEET NOTE
07/19/21 1542   Provider Notification   Reason for Communication Review case   Provider Name Dr. Omar Farrell   Provider Notification Physician   Method of Communication Call   Response No new orders   Notification Time [phone]     Notified of ongoing hypertension, reviewed current orders for catapres. /108 an hour after scheduled dose. Continue to monitor for now.  Electronically signed by Griselda Luciano RN on 7/19/2021 at 3:43 PM

## 2021-07-19 NOTE — PLAN OF CARE
Problem: Falls - Risk of:  Goal: Will remain free from falls  Description: Will remain free from falls  Outcome: Ongoing  Note: The patient remained free from falls this shift, call light within reach, bed in locked and lowest position. Side rails up x2. Continue to monitor closely. Problem: Skin Integrity:  Goal: Will show no infection signs and symptoms  Description: Will show no infection signs and symptoms  7/19/2021 1517 by Nick Lind RN  Outcome: Ongoing  Note: Patient cellulitis outlines, no worsening this shift, ice on and off throughout shift. Will continue to monitor. Problem: Pain:  Goal: Pain level will decrease  Description: Pain level will decrease  Outcome: Ongoing  Note: Patient able to verbalize needs, given medication as prescribed. Will continue to monitor. Problem: Pain:  Goal: Control of acute pain  Description: Control of acute pain  Outcome: Ongoing     Problem: Health Maintenance - Impaired:  Goal: Ability to manage health-related needs will improve  Description: Ability to manage health-related needs will improve  Outcome: Ongoing     Problem: Mood - Altered:  Goal: Mood stable  Description: Mood stable  7/19/2021 1517 by Nick Lind RN  Outcome: Ongoing  Note: Patients is calm and cooperative throughout this shift, tele-sitter present in room. Will continue to monitor.

## 2021-07-19 NOTE — PROGRESS NOTES
[place] [address] Internal Medicine    Progress Note     7/19/2021    10:09 AM    Name:   Iram Alarcon  MRN:     [id number]     Acct:      [de-identified]   Room:   [id number] Day:  3  Admit Date:  7/15/2021  6:30 PM    PCP:   Swapnil JERONIMO PARICHIE  Code Status:  Full Code    Subjective:     C/C:   Chief Complaint   Patient presents with    Drug Overdose     Principal Problem:    Heroin overdose, accidental or unintentional, initial encounter ([facility] [address]  Active Problems:    Depression    Tobacco abuse    Anxiety    History of hypothyroidism    Rhabdomyolysis    Hypoglycemia    Cellulitis of right upper extremity  Resolved Problems:    * No resolved hospital problems.  *        Significant last 24 hr data reviewed ;   Vitals:    07/18/21 1926 07/19/21 0020 07/19/21 0752 07/19/21 0925   BP: (!) 149/105 (!) 156/103 (!) 151/101 (!) 155/105   Pulse: 91 69 65    Resp: 15 16 16    Temp: 98.4 °F (36.9 °C) 98.4 °F (36.9 °C) 98.4 °F (36.9 °C)    TempSrc: Oral Oral Oral    SpO2: 97% 96% 98%    Weight:  180 lb 12.4 oz (82 kg)     Height:          Recent Results (from the past 24 hour(s))   POC Glucose Fingerstick    Collection Time: 07/18/21 11:56 AM   Result Value Ref Range    POC Glucose 62 (L) 65 - 105 mg/dL   POC Glucose Fingerstick    Collection Time: 07/18/21  5:13 PM   Result Value Ref Range    POC Glucose 87 65 - 105 mg/dL   POC Glucose Fingerstick    Collection Time: 07/18/21  8:42 PM   Result Value Ref Range    POC Glucose 106 (H) 65 - 105 mg/dL   Basic Metabolic Panel w/ Reflex to MG    Collection Time: 07/19/21  4:56 AM   Result Value Ref Range    Glucose 95 70 - 99 mg/dL    BUN 4 (L) 6 - 20 mg/dL    CREATININE 0.60 0.50 - 0.90 mg/dL    Bun/Cre Ratio NOT REPORTED 9 - 20    Calcium 8.3 (L) 8.6 - 10.4 mg/dL    Sodium 134 (L) 135 - 144 mmol/L    Potassium 3.9 3.7 - 5.3 mmol/L    Chloride 104 98 - 107 mmol/L    CO2 20 20 - 31 mmol/L    Anion Gap 10 9 - 17 mmol/L    GFR Non-African American no change    Family History: @no change    Vitals:      I/O (24Hr): Intake/Output Summary (Last 24 hours) at 7/19/2021 1009  Last data filed at 7/19/2021 0624  Gross per 24 hour   Intake 4445 ml   Output 3150 ml   Net 1295 ml       Labs:    URINE ANALYSIS: No results found for: LABURIN     CBC:  Lab Results   Component Value Date    WBC 3.8 07/19/2021    HGB 10.9 07/19/2021     07/19/2021        BMP:    Lab Results   Component Value Date     07/19/2021    K 3.9 07/19/2021     07/19/2021    CO2 20 07/19/2021    BUN 4 07/19/2021    CREATININE 0.60 07/19/2021    GLUCOSE 95 07/19/2021      LIVER PROFILE:  Lab Results   Component Value Date    ALT 62 07/15/2021    AST 62 07/15/2021    PROT 8.6 07/15/2021    BILITOT 0.25 07/15/2021    LABALBU 4.6 07/15/2021               Radiology:  Medications:      Allergies:      Current Meds:   Scheduled Meds:    cloNIDine  0.1 mg Oral TID    [Held by provider] levothyroxine  25 mcg Oral Daily    [Held by provider] sertraline  25 mg Oral Daily    sodium chloride flush  5-40 mL Intravenous 2 times per day    enoxaparin  40 mg Subcutaneous Daily    famotidine (PEPCID) injection  20 mg Intravenous BID    nicotine  1 patch Transdermal Daily    vancomycin (VANCOCIN) intermittent dosing (placeholder)   Other RX Placeholder    vancomycin  1,000 mg Intravenous Q12H    ciprofloxacin  400 mg Intravenous Q12H     Continuous Infusions:    sodium chloride      sodium chloride 150 mL/hr at 07/19/21 0518    dextrose       PRN Meds: naloxone, sodium chloride flush, sodium chloride, potassium chloride **OR** potassium alternative oral replacement **OR** potassium chloride, magnesium sulfate, polyethylene glycol, acetaminophen **OR** acetaminophen, ondansetron, promethazine (PHENERGAN) in sodium chloride 0.9% IVPB, glucose, dextrose, glucagon (rDNA), dextrose, ondansetron      Physical Examination:        BP (!) 155/105   Pulse 65   Temp 98.4 °F (36.9 °C) (Oral) Resp 16   Ht 5' 4\" (1.626 m)   Wt 180 lb 12.4 oz (82 kg)   SpO2 98%   BMI 31.03 kg/m²   Temp (24hrs), Av.4 °F (36.9 °C), Min:98.2 °F (36.8 °C), Max:98.4 °F (36.9 °C)    Recent Labs     21  1156 21  1713 21  0657   POCGLU 62* 87 106* 85       Intake/Output Summary (Last 24 hours) at 2021 1009  Last data filed at 2021 4628  Gross per 24 hour   Intake 4445 ml   Output 3150 ml   Net 1295 ml       General Appearance:  alert, well appearing, and in no acute distress  Mental status:   Head:  normocephalic, atraumatic. Eye: no icterus, redness, pupils equal and reactive, extraocular eye movements intact, conjunctiva clear  Ear: normal external ear, no discharge, hearing intact  Nose:  no drainage noted  Mouth: mucous membranes moist  Neck: supple, no carotid bruits, thyroid not palpable  Lungs: Bilateral equal air entry, clear to ausculation, no wheezing, rales or rhonchi, normal effort  Cardiovascular: normal rate, regular rhythm, no murmur, gallop, rub.   Abdomen: Soft, nontender, nondistended, normal bowel sounds, no hepatomegaly or splenomegaly  Neurologic: There are no new focal motor or sensory deficits,   Skin: No gross lesions, rashes, bruising or bleeding on exposed skin area  Extremities: Duration noted in the right elbow antecubital fossa 2+ edema  No neuro deficit noted          Assessment:        Primary Problem  Heroin overdose, accidental or unintentional, initial encounter [facility])    Active Hospital Problems    Diagnosis Date Noted    Cellulitis of right upper extremity [L03.113] 2021    Heroin overdose, accidental or unintentional, initial encounter ([facility] [address] [T40.1X1A] 07/15/2021    Rhabdomyolysis [M62.82] 07/15/2021    Hypoglycemia [E16.2] 07/15/2021    Anxiety [F41.9] 2019    History of hypothyroidism [Z86.39] 2019    Depression [F32.9] 2014    Tobacco abuse [Z72.0] 2014     Plan:        42-year-old  lady intravenous drug abuser admitted with right arm swelling drug overdose hypoxic respiratory failure received Narcan  Also noted to have extensive edema and right extremity with cellulitis receiving IV vancomycin and Cipro  No fever white cell count is 3.8  We will do CT scan with contrast right elbow rule out abscess or collection clinically no signs of necrotizing fasciitis  We will check for hepatitis C HIV for high risk behavior due to drug abuse          Allie Ohara MD  7/19/2021  10:09 AM

## 2021-07-19 NOTE — CONSULTS
Department of Psychiatry   Psychiatric Assessment     Thank you very much for allowing us to participate in the care of this patient.       Reason for Consult:  \"Drug OD on admission as well as on floor\"     HISTORY OF PRESENT ILLNESS:       The patient is a 46 y.o. female with significant history of depression, anxiety, subclinical hypothyroidism, and substance abuse who is admitted medically for accidental heroin overdose.      Patient is cooperative with interview, but is somewhat minimizing of recent events. Patient reports that prior to admission she had been drinking alcohol with friends and decided to try heroin after being clean for approximately 3 years to see if she would \"feel\" anything. She reports now that she feels this was stupid and verbalizes regret over her actions. Patient also overdosed 7/17/21 while in the hospital, but does not discuss this with writer.      Patient reports that her mood is low now because of being in the hospital and feeling lonely, but reports that her mood is sometimes low when she is home as well. She endorses anhedonia and reports that she does not have the desire to go out and do things, preferring to stay at home but not getting pleasure out of this either. She reports her energy level has been \"horrible\" and feels that she has \"none\". She reports that her sleep is \"sometimes really good\" and other times is very restless with frequent waking. She reports on good nights she sleeps approximately 10 hours and on restless nights she sleeps 6-7 hours, however, feels tired when waking and that she needs more sleep regardless of how many hours she sleeps. She also endorses decreased concentration, psychomotor slowing, and feelings of guilt, worthlessness, and hopelessness.  Patient becomes tearful and guarded when discussing these feelings and asks for the interview to be wrapped up at this time, but agrees to continue.      Patient reports that at baseline her level of anxiety is a 5 out of 10 (on a scale of 0-10, with 0 being no anxiety and 10 being the highest). She reports feeling excessively worried, restless, edgy, and easily fatigued. She reports that she had her first and only panic attack approximately 1.5 to 2 months ago during which she experienced flushing, hyperventilation, and shortness of breath, but reports that this occurred while her neighbor, who is also her friend, was running down their street naked and felt that this may have been the cause of this attack. She reports that she was prescribed Zoloft by her PCP, but stopped taking this medication approximately 5 months ago because it made her feel tired.      Patient denies active and passive suicidal ideation, homicidal ideation, auditory and visual hallucinations, paranoia, delusions, current or historic symptoms of nasrin, and PTSD. Patient reports feeling that she will be able to maintain sobriety on her own on and denies interest in substance use treatment on discharge, but verbalizes that she may have interest in therapy or counseling. She has no concerns with her safety in the hospital or at home and identifies her family as a supportive.     PSYCHIATRIC HISTORY:      · Outpatient psychiatric provider:  None  · Suicide attempts: Denies  · Inpatient psychiatric admissions: Denies     Past psychiatric medications includes:      Zoloft- Patient stopped taking approximately 5 months ago because it made her tired. Reports she had taken it in the past and it worked well and did not cause drowsiness at that time.    Lexapro- Patient is unsure if this medication was beneficial.      Adverse reactions from psychotropic medications:     Zoloft- drowsiness      Lifetime Psychiatric Review of Systems          Depression: Endorses     Anxiety: Endorses     Panic Attacks: Endorses     Nasrin or Hypomania: Denies     Phobias: Denies     Obsessions and Compulsions: Denies     Visual Hallucinations: Denies     Auditory Hallucinations: Denies     Delusions/Paranoia: Denies     PTSD: Denies     Trauma:  Denies     Home Medications           Prior to Admission medications    Medication Sig Start Date End Date Taking?  Authorizing Provider   levothyroxine (SYNTHROID) 25 MCG tablet TAKE ONE TABLET BY MOUTH DAILY 4/28/20     Randy Westfall PA-C   sertraline (ZOLOFT) 25 MG tablet Take 1 tablet by mouth daily 4/28/20     Randy Westfall PA-C            Medications:      Current Hospital Medications   Current Facility-Administered Medications: cloNIDine (CATAPRES) tablet 0.1 mg, 0.1 mg, Oral, TID  naloxone (NARCAN) injection 0.4 mg, 0.4 mg, Intravenous, PRN  [Held by provider] levothyroxine (SYNTHROID) tablet 25 mcg, 25 mcg, Oral, Daily  [Held by provider] sertraline (ZOLOFT) tablet 25 mg, 25 mg, Oral, Daily  sodium chloride flush 0.9 % injection 5-40 mL, 5-40 mL, Intravenous, 2 times per day  sodium chloride flush 0.9 % injection 10 mL, 10 mL, Intravenous, PRN  0.9 % sodium chloride infusion, 25 mL, Intravenous, PRN  potassium chloride (KLOR-CON M) extended release tablet 40 mEq, 40 mEq, Oral, PRN **OR** potassium bicarb-citric acid (EFFER-K) effervescent tablet 40 mEq, 40 mEq, Oral, PRN **OR** potassium chloride 10 mEq/100 mL IVPB (Peripheral Line), 10 mEq, Intravenous, PRN  magnesium sulfate 1000 mg in dextrose 5% 100 mL IVPB, 1,000 mg, Intravenous, PRN  enoxaparin (LOVENOX) injection 40 mg, 40 mg, Subcutaneous, Daily  polyethylene glycol (GLYCOLAX) packet 17 g, 17 g, Oral, Daily PRN  acetaminophen (TYLENOL) tablet 650 mg, 650 mg, Oral, Q6H PRN **OR** acetaminophen (TYLENOL) suppository 650 mg, 650 mg, Rectal, Q6H PRN  ondansetron (ZOFRAN) injection 4 mg, 4 mg, Intravenous, Q6H PRN  nicotine (NICODERM CQ) 21 MG/24HR 1 patch, 1 patch, Transdermal, Daily  promethazine (PHENERGAN) 25 mg in sodium chloride 0.9 % 50 mL IVPB, 25 mg, Intravenous, Q6H PRN  glucose (GLUTOSE) 40 % oral gel 15 g, 15 g, Oral, PRN  dextrose 50 % IV solution, 12.5 g, Intravenous, PRN  glucagon (rDNA) injection 1 mg, 1 mg, Intramuscular, PRN  dextrose 5 % solution, 100 mL/hr, Intravenous, PRN  vancomycin (VANCOCIN) intermittent dosing (placeholder), , Other, RX Placeholder  vancomycin 1000 mg IVPB in 250 mL D5W addavial, 1,000 mg, Intravenous, Q12H  ondansetron (ZOFRAN) tablet 4 mg, 4 mg, Oral, Q8H PRN  ciprofloxacin (CIPRO) IVPB 400 mg, 400 mg, Intravenous, Q12H         Past Medical History:    Past Medical History             Diagnosis Date    Anxiety 7/30/2019    Depression      Subclinical hypothyroidism 7/15/2014            Past Surgical History:    Past Surgical History             Procedure Laterality Date    TUBAL LIGATION                Allergies: Patient has no known allergies.       Social History:       Born in: [place], [place]  Family: Patient grew up with parents and one older brother. Mother passed away when patient was 17 y/o. Her father remarried when she was 14 or 18. Highest Level of Education: Some college. Occupation: Works at [employer]   Marital Status:  but in a relationship with her ex-. Children: 2 daughters- ages 13 and 25. Residence: Lives at home with her ex-, ex-mother in law, and her youngest daughter. Stressors: Recent relapse on substances  Patient Assets/Supportive Factors: Children, ex-      SUBSTANCE USE HISTORY: Patient reports that prior to overdose leading to admission she had been clean for approximately 3 years, but prior to that for five years she had been using $20-40 worth of IV heroin daily. Patient reports that she drinks approximately twice per week on the weekend and typically drinks 5-6 beers. She also reports that she occassionally snorts cocaine when drinking alcohol and that she does this approximately once per month.  She reports smoking 6-7 cigarettes per day and denies smokeless tobacco use.      Of note, patient also overdosed on heroin Saturday while in the hospital and required narcan and CPR.      Family Medical and Psychiatric History:      Patient denies knowledge of family psychiatric history. Denies family history of completed suicides. Reports marijuana use on maternal and paternal sides of family.      Family History             Problem Relation Age of Onset    Diabetes Father      High Blood Pressure Father      Heart Disease Maternal Grandmother      Cancer Maternal Grandfather                 Physical  BP (!) 155/105   Pulse 65   Temp 98.4 °F (36.9 °C) (Oral)   Resp 16   Ht 5' 4\" (1.626 m)   Wt 180 lb 12.4 oz (82 kg)   SpO2 98%   BMI 31.03 kg/m²            Mental Status Examination:  Level of consciousness:  Within normal limits  Appearance: hospital attire, lying in bed, fair grooming  Behavior/Motor:  no abnormalities noted  Attitude toward examiner:  cooperative, attentive and minimal eye contact  Speech:  Spontaneous, normal rate and volume  Mood:  \"Bummed\"  Affect: Mood congruent  Thought processes:  Linear, coherent  Thought content: Denies suicidal ideations              Denies homicidal ideations               Denies hallucinations              Denies delusions  Cognition:  Oriented to self, situation, location, date  Concentration clinically adequate  Memory age appropriate  Insight & Judgment:  fair     DSM-5 DIAGNOSIS:       Major depressive disorder, recurrent, moderate, without psychotic features     CONY  MATY     Stressors     Severity of stressors is moderate  Source of stressors include: Other psychosocial and environmental stressors     PLAN:    See below  Additional recommendations will follow the clinical course.         Thank you very much for allowing us to participate in the care of this patient. Time spent 60 min.       Electronically signed by Lakesha Duggan on 7/19/21 at 11:25 AM EDT           I independently saw and evaluated the patient. I reviewed the midlevel provider student's documentation above.   Any additional comments or changes to the midlevel provider student's documentation are stated below otherwise agree with assessment.      -Noted that the patient was brought to the ER yesterday after being found unresponsive by EMS. Noted that the patient received 10 mg of Narcan prior to coming to the hospital.  Around 1 PM on 7/17/2021 a [facility] was called. The patient required 2 minutes of CPR and was found to have pinpoint pupils. It was noted that some white powder was found in the patient's room. -The patient was seen at bedside. Her daughters were present and were asked to step out of the room. The patient states that she took an accidental overdose after several years of sobriety. She states she has been drinking prior to the overdose. She said she had not intended to take an overdose and she did not know what she was taking was potentially fatal.  The patient admits to some anxiety and depressive symptoms but denies suicidal ideation. She is concerned about her relapse and wants help. Noted that the patient's urine drug screen was positive for amphetamines and cocaine. Her opiates were negative    Diagnosis-depression unspecified  CONY    PLAN  Admit to psychiatry once medically cleared  Sitter should be in place  Attempt to develop insight  Psycho-education conducted. Supportive Therapy conducted.   Follow-up daily while on inpatient unit    Electronically signed by Lauro Patel MD on 7/19/21 at 6:14 PM EDT

## 2021-07-19 NOTE — CARE COORDINATION
ONGOING DISCHARGE PLAN:    Patient is alert and oriented x4. Spoke with patient regarding discharge plan and patient confirms that plan is still to return to home on PO antibiotics. Still needs 1-2 more days of IV antibiotics inpatient per MD    Order for CT elbow noted. Awaiting psych consult. Patient will find her own PCP - [facility] number provided to patient. Orange Header - 12%    Will continue to follow for additional discharge needs.     Electronically signed by Nicole Milton RN on 7/19/2021 at 10:58 AM

## 2021-07-19 NOTE — PROGRESS NOTES
RN called Access RN for peripheral line placement as staff here has been unable to gain access.  Electronically signed by Ce Desai RN on 7/19/2021 at 2:59 PM

## 2021-07-19 NOTE — PROGRESS NOTES
Staffing was notified that Dr. Kelechi Dotson would like a sitter for safety. Telesitter remains in place until staffing can get 1:1. Dr. Kelechi Dotson aware.

## 2021-07-19 NOTE — PLAN OF CARE
Problem: Coping - Ineffective, Individual:  Goal: Ability to identify and develop effective coping behavior will improve  Description: Ability to identify and develop effective coping behavior will improve  Outcome: Ongoing  Note: Ability to identify and develop effective coping behavior will improve     Problem: Mood - Altered:  Goal: Mood stable  Description: Mood stable  7/19/2021 0415 by Grabiel Frausto RN  Outcome: Ongoing     Problem: Violence - Risk of, Self/Other-Directed:  Goal: Knowledge of developmental care interventions  Description: Absence of violence  Outcome: Ongoing     Problem: Skin Integrity:  Goal: Will show no infection signs and symptoms  Description: Will show no infection signs and symptoms  7/19/2021 0415 by Grabiel Frausto RN  Note: Skin assessment complete. ICE applied to RUE PRN. Area kept free from moisture. Proper nourishment and fluids encouraged, as appropriate. Will continue to monitor for additional needs and changes in skin breakdown. 7/18/2021 1546 by Juan Carlos Diaz RN  Note: Pt shows no signs or symptoms of infection at this time. VS stable, alert and oriented x4. Hand hygiene utilized upon entry and exit. Will continue to monitor. Problem: Injury - Risk of, Substance Overdose:  Goal: No signs of physiological stress  Description: Absence of drug withdrawal signs and symptoms  Note: No falls noted this shift. Patient ambulates with x1 staff assistance without difficulty. Bed kept in low position. Safe environment maintained. Bedside table & call light in reach. Uses call light appropriately when needing assistance.

## 2021-07-19 NOTE — CARE COORDINATION
Department of Psychiatry   Psychiatric Assessment  Care Coordination Note    This note is for care coordination only. It is not to be used for billing. Thank you very much for allowing us to participate in the care of this patient. Reason for Consult:  \"Drug OD on admission as well as on floor\"    HISTORY OF PRESENT ILLNESS:      The patient is a 46 y.o. female with significant history of depression, anxiety, subclinical hypothyroidism, and substance abuse who is admitted medically for accidental heroin overdose. Patient is cooperative with interview, but is somewhat minimizing of recent events. Patient reports that prior to admission she had been drinking alcohol with friends and decided to try heroin after being clean for approximately 3 years to see if she would \"feel\" anything. She reports now that she feels this was stupid and verbalizes regret over her actions. Patient also overdosed 7/17/21 while in the hospital, but does not discuss this with writer. Patient reports that her mood is low now because of being in the hospital and feeling lonely, but reports that her mood is sometimes low when she is home as well. She endorses anhedonia and reports that she does not have the desire to go out and do things, preferring to stay at home but not getting pleasure out of this either. She reports her energy level has been \"horrible\" and feels that she has \"none\". She reports that her sleep is \"sometimes really good\" and other times is very restless with frequent waking. She reports on good nights she sleeps approximately 10 hours and on restless nights she sleeps 6-7 hours, however, feels tired when waking and that she needs more sleep regardless of how many hours she sleeps. She also endorses decreased concentration, psychomotor slowing, and feelings of guilt, worthlessness, and hopelessness.  Patient becomes tearful and guarded when discussing these feelings and asks for the interview to be wrapped up at this time, but agrees to continue. Patient reports that at baseline her level of anxiety is a 5 out of 10 (on a scale of 0-10, with 0 being no anxiety and 10 being the highest). She reports feeling excessively worried, restless, edgy, and easily fatigued. She reports that she had her first and only panic attack approximately 1.5 to 2 months ago during which she experienced flushing, hyperventilation, and shortness of breath, but reports that this occurred while her neighbor, who is also her friend, was running down their street naked and felt that this may have been the cause of this attack. She reports that she was prescribed Zoloft by her PCP, but stopped taking this medication approximately 5 months ago because it made her feel tired. Patient denies active and passive suicidal ideation, homicidal ideation, auditory and visual hallucinations, paranoia, delusions, current or historic symptoms of nasrin, and PTSD. Patient reports feeling that she will be able to maintain sobriety on her own on and denies interest in substance use treatment on discharge, but verbalizes that she may have interest in therapy or counseling. She has no concerns with her safety in the hospital or at home and identifies her family as a supportive. PSYCHIATRIC HISTORY:      · Outpatient psychiatric provider:  None  · Suicide attempts: Denies  · Inpatient psychiatric admissions: Denies    Past psychiatric medications includes:     Zoloft- Patient stopped taking approximately 5 months ago because it made her tired. Reports she had taken it in the past and it worked well and did not cause drowsiness at that time.    Lexapro- Patient is unsure if this medication was beneficial.     Adverse reactions from psychotropic medications:    Zoloft- drowsiness     Lifetime Psychiatric Review of Systems         Depression: Endorses     Anxiety: Endorses     Panic Attacks: Endorses     Nasrin or Hypomania: Denies     Phobias: Denies Obsessions and Compulsions: Denies     Visual Hallucinations: Denies     Auditory Hallucinations: Denies     Delusions/Paranoia: Denies     PTSD: Denies     Trauma:  Denies    Prior to Admission medications    Medication Sig Start Date End Date Taking?  Authorizing Provider   levothyroxine (SYNTHROID) 25 MCG tablet TAKE ONE TABLET BY MOUTH DAILY 4/28/20   Eric OzMAGDA   sertraline (ZOLOFT) 25 MG tablet Take 1 tablet by mouth daily 4/28/20   Eric Oz, MAGDA        Medications:    Current Facility-Administered Medications: cloNIDine (CATAPRES) tablet 0.1 mg, 0.1 mg, Oral, TID  naloxone (NARCAN) injection 0.4 mg, 0.4 mg, Intravenous, PRN  [Held by provider] levothyroxine (SYNTHROID) tablet 25 mcg, 25 mcg, Oral, Daily  [Held by provider] sertraline (ZOLOFT) tablet 25 mg, 25 mg, Oral, Daily  sodium chloride flush 0.9 % injection 5-40 mL, 5-40 mL, Intravenous, 2 times per day  sodium chloride flush 0.9 % injection 10 mL, 10 mL, Intravenous, PRN  0.9 % sodium chloride infusion, 25 mL, Intravenous, PRN  potassium chloride (KLOR-CON M) extended release tablet 40 mEq, 40 mEq, Oral, PRN **OR** potassium bicarb-citric acid (EFFER-K) effervescent tablet 40 mEq, 40 mEq, Oral, PRN **OR** potassium chloride 10 mEq/100 mL IVPB (Peripheral Line), 10 mEq, Intravenous, PRN  magnesium sulfate 1000 mg in dextrose 5% 100 mL IVPB, 1,000 mg, Intravenous, PRN  enoxaparin (LOVENOX) injection 40 mg, 40 mg, Subcutaneous, Daily  polyethylene glycol (GLYCOLAX) packet 17 g, 17 g, Oral, Daily PRN  acetaminophen (TYLENOL) tablet 650 mg, 650 mg, Oral, Q6H PRN **OR** acetaminophen (TYLENOL) suppository 650 mg, 650 mg, Rectal, Q6H PRN  ondansetron (ZOFRAN) injection 4 mg, 4 mg, Intravenous, Q6H PRN  nicotine (NICODERM CQ) 21 MG/24HR 1 patch, 1 patch, Transdermal, Daily  promethazine (PHENERGAN) 25 mg in sodium chloride 0.9 % 50 mL IVPB, 25 mg, Intravenous, Q6H PRN  glucose (GLUTOSE) 40 % oral gel 15 g, 15 g, Oral, PRN  dextrose 50 % IV solution, 12.5 g, Intravenous, PRN  glucagon (rDNA) injection 1 mg, 1 mg, Intramuscular, PRN  dextrose 5 % solution, 100 mL/hr, Intravenous, PRN  vancomycin (VANCOCIN) intermittent dosing (placeholder), , Other, RX Placeholder  vancomycin 1000 mg IVPB in 250 mL D5W addavial, 1,000 mg, Intravenous, Q12H  ondansetron (ZOFRAN) tablet 4 mg, 4 mg, Oral, Q8H PRN  ciprofloxacin (CIPRO) IVPB 400 mg, 400 mg, Intravenous, Q12H     Past Medical History:        Diagnosis Date    Anxiety 7/30/2019    Depression     Subclinical hypothyroidism 7/15/2014       Past Surgical History:        Procedure Laterality Date    TUBAL LIGATION         Allergies: Patient has no known allergies. Social History:      Born in: [place], [place]  Family: Patient grew up with parents and one older brother. Mother passed away when patient was 17 y/o. Her father remarried when she was 14 or 18. Highest Level of Education: Some college. Occupation: Works at [employer]   Marital Status:  but in a relationship with her ex-. Children: 2 daughters- ages 13 and 25. Residence: Lives at home with her ex-, ex-mother in law, and her youngest daughter. Stressors: Recent relapse on substances  Patient Assets/Supportive Factors: Children, ex-     SUBSTANCE USE HISTORY: Patient reports that prior to overdose leading to admission she had been clean for approximately 3 years, but prior to that for five years she had been using $20-40 worth of IV heroin daily. Patient reports that she drinks approximately twice per week on the weekend and typically drinks 5-6 beers. She also reports that she occassionally snorts cocaine when drinking alcohol and that she does this approximately once per month. She reports smoking 6-7 cigarettes per day and denies smokeless tobacco use. Of note, patient also overdosed on heroin Saturday while in the hospital and required narcan and CPR.      Family Medical and Psychiatric History: Patient denies knowledge of family psychiatric history. Denies family history of completed suicides. Reports marijuana use on maternal and paternal sides of family. Problem Relation Age of Onset    Diabetes Father     High Blood Pressure Father     Heart Disease Maternal Grandmother     Cancer Maternal Grandfather          Physical  BP (!) 155/105   Pulse 65   Temp 98.4 °F (36.9 °C) (Oral)   Resp 16   Ht 5' 4\" (1.626 m)   Wt 180 lb 12.4 oz (82 kg)   SpO2 98%   BMI 31.03 kg/m²         Mental Status Examination:  Level of consciousness:  Within normal limits  Appearance: hospital attire, lying in bed, fair grooming  Behavior/Motor:  no abnormalities noted  Attitude toward examiner:  cooperative, attentive and minimal eye contact  Speech:  Spontaneous, normal rate and volume  Mood:  \"Bummed\"  Affect: Mood congruent  Thought processes:  Linear, coherent  Thought content: Denies suicidal ideations   Denies homicidal ideations    Denies hallucinations   Denies delusions  Cognition:  Oriented to self, situation, location, date  Concentration clinically adequate  Memory age appropriate  Insight & Judgment:  fair    DSM-5 DIAGNOSIS:      Major depressive disorder, recurrent, moderate, without psychotic features    CONY  MATY    Stressors     Severity of stressors is moderate  Source of stressors include: Other psychosocial and environmental stressors    PLAN:      Patient does not require psychiatric hospitalization as she is not a risk to herself or others and is able to contract for safety. Patient can be offered outpatient resources for follow up on discharge. Additional recommendations will follow the clinical course. Thank you very much for allowing us to participate in the care of this patient. Time spent 60 min.       Electronically signed by Alona Turner on 7/19/21 at 11:25 AM EDT

## 2021-07-20 ENCOUNTER — HOSPITAL ENCOUNTER (INPATIENT)
Age: 41
LOS: 4 days | Discharge: HOME | DRG: 751 | End: 2021-07-24
Attending: PSYCHIATRY & NEUROLOGY | Admitting: PSYCHIATRY & NEUROLOGY
Payer: MEDICARE

## 2021-07-20 ENCOUNTER — APPOINTMENT (OUTPATIENT)
Dept: CT IMAGING | Age: 41
DRG: 816 | End: 2021-07-20
Payer: MEDICARE

## 2021-07-20 VITALS
OXYGEN SATURATION: 95 % | TEMPERATURE: 97.6 F | HEIGHT: 64 IN | SYSTOLIC BLOOD PRESSURE: 141 MMHG | HEART RATE: 64 BPM | WEIGHT: 171.3 LBS | DIASTOLIC BLOOD PRESSURE: 100 MMHG | BODY MASS INDEX: 29.24 KG/M2 | RESPIRATION RATE: 18 BRPM

## 2021-07-20 PROBLEM — F32.A DEPRESSION WITH SUICIDAL IDEATION: Status: ACTIVE | Noted: 2021-07-20

## 2021-07-20 PROBLEM — R45.851 DEPRESSION WITH SUICIDAL IDEATION: Status: ACTIVE | Noted: 2021-07-20

## 2021-07-20 LAB
ANION GAP SERPL CALCULATED.3IONS-SCNC: 11 MMOL/L (ref 9–17)
BUN BLDV-MCNC: 6 MG/DL (ref 6–20)
BUN/CREAT BLD: ABNORMAL (ref 9–20)
CALCIUM SERPL-MCNC: 8.7 MG/DL (ref 8.6–10.4)
CHLORIDE BLD-SCNC: 103 MMOL/L (ref 98–107)
CO2: 22 MMOL/L (ref 20–31)
CREAT SERPL-MCNC: 0.63 MG/DL (ref 0.5–0.9)
GFR AFRICAN AMERICAN: >60 ML/MIN
GFR NON-AFRICAN AMERICAN: >60 ML/MIN
GFR SERPL CREATININE-BSD FRML MDRD: ABNORMAL ML/MIN/{1.73_M2}
GFR SERPL CREATININE-BSD FRML MDRD: ABNORMAL ML/MIN/{1.73_M2}
GLUCOSE BLD-MCNC: 101 MG/DL (ref 65–105)
GLUCOSE BLD-MCNC: 106 MG/DL (ref 70–99)
GLUCOSE BLD-MCNC: 119 MG/DL (ref 65–105)
GLUCOSE BLD-MCNC: 95 MG/DL (ref 65–105)
HCT VFR BLD CALC: 32.6 % (ref 36–46)
HEMOGLOBIN: 10.7 G/DL (ref 12–16)
MCH RBC QN AUTO: 28.4 PG (ref 26–34)
MCHC RBC AUTO-ENTMCNC: 32.9 G/DL (ref 31–37)
MCV RBC AUTO: 86.2 FL (ref 80–100)
NRBC AUTOMATED: ABNORMAL PER 100 WBC
PDW BLD-RTO: 15 % (ref 11.5–14.9)
PLATELET # BLD: 208 K/UL (ref 150–450)
PMV BLD AUTO: 8.5 FL (ref 6–12)
POTASSIUM SERPL-SCNC: 4.2 MMOL/L (ref 3.7–5.3)
RBC # BLD: 3.78 M/UL (ref 4–5.2)
SODIUM BLD-SCNC: 136 MMOL/L (ref 135–144)
WBC # BLD: 3.4 K/UL (ref 3.5–11)

## 2021-07-20 PROCEDURE — 2580000003 HC RX 258: Performed by: INTERNAL MEDICINE

## 2021-07-20 PROCEDURE — 85027 COMPLETE CBC AUTOMATED: CPT

## 2021-07-20 PROCEDURE — 1240000000 HC EMOTIONAL WELLNESS R&B

## 2021-07-20 PROCEDURE — 6370000000 HC RX 637 (ALT 250 FOR IP): Performed by: INTERNAL MEDICINE

## 2021-07-20 PROCEDURE — 6360000002 HC RX W HCPCS: Performed by: INTERNAL MEDICINE

## 2021-07-20 PROCEDURE — 2580000003 HC RX 258: Performed by: NURSE PRACTITIONER

## 2021-07-20 PROCEDURE — 99239 HOSP IP/OBS DSCHRG MGMT >30: CPT | Performed by: INTERNAL MEDICINE

## 2021-07-20 PROCEDURE — 82947 ASSAY GLUCOSE BLOOD QUANT: CPT

## 2021-07-20 PROCEDURE — 6370000000 HC RX 637 (ALT 250 FOR IP): Performed by: NURSE PRACTITIONER

## 2021-07-20 PROCEDURE — 80048 BASIC METABOLIC PNL TOTAL CA: CPT

## 2021-07-20 PROCEDURE — 99232 SBSQ HOSP IP/OBS MODERATE 35: CPT | Performed by: PSYCHIATRY & NEUROLOGY

## 2021-07-20 PROCEDURE — 36415 COLL VENOUS BLD VENIPUNCTURE: CPT

## 2021-07-20 PROCEDURE — 6360000002 HC RX W HCPCS: Performed by: NURSE PRACTITIONER

## 2021-07-20 PROCEDURE — 6360000004 HC RX CONTRAST MEDICATION: Performed by: INTERNAL MEDICINE

## 2021-07-20 PROCEDURE — 73201 CT UPPER EXTREMITY W/DYE: CPT

## 2021-07-20 RX ORDER — LEVOTHYROXINE SODIUM 0.03 MG/1
1 TABLET ORAL DAILY
Status: CANCELLED | OUTPATIENT
Start: 2021-07-20

## 2021-07-20 RX ORDER — IBUPROFEN 600 MG/1
600 TABLET ORAL EVERY 8 HOURS PRN
Status: CANCELLED | OUTPATIENT
Start: 2021-07-20

## 2021-07-20 RX ORDER — HYDROXYZINE 50 MG/1
50 TABLET, FILM COATED ORAL 3 TIMES DAILY PRN
Status: DISCONTINUED | OUTPATIENT
Start: 2021-07-20 | End: 2021-07-24 | Stop reason: HOSPADM

## 2021-07-20 RX ORDER — DOXYCYCLINE 100 MG/1
100 CAPSULE ORAL EVERY 12 HOURS SCHEDULED
Status: CANCELLED | OUTPATIENT
Start: 2021-07-20 | End: 2021-07-30

## 2021-07-20 RX ORDER — MAGNESIUM HYDROXIDE/ALUMINUM HYDROXICE/SIMETHICONE 120; 1200; 1200 MG/30ML; MG/30ML; MG/30ML
30 SUSPENSION ORAL EVERY 6 HOURS PRN
Status: DISCONTINUED | OUTPATIENT
Start: 2021-07-20 | End: 2021-07-24 | Stop reason: HOSPADM

## 2021-07-20 RX ORDER — 0.9 % SODIUM CHLORIDE 0.9 %
80 INTRAVENOUS SOLUTION INTRAVENOUS ONCE
Status: COMPLETED | OUTPATIENT
Start: 2021-07-20 | End: 2021-07-20

## 2021-07-20 RX ORDER — POLYETHYLENE GLYCOL 3350 17 G/17G
17 POWDER, FOR SOLUTION ORAL DAILY PRN
Status: DISCONTINUED | OUTPATIENT
Start: 2021-07-20 | End: 2021-07-24 | Stop reason: HOSPADM

## 2021-07-20 RX ORDER — IBUPROFEN 400 MG/1
400 TABLET ORAL EVERY 6 HOURS PRN
Status: DISCONTINUED | OUTPATIENT
Start: 2021-07-20 | End: 2021-07-24 | Stop reason: HOSPADM

## 2021-07-20 RX ORDER — SERTRALINE HYDROCHLORIDE 25 MG/1
25 TABLET, FILM COATED ORAL DAILY
Status: DISCONTINUED | OUTPATIENT
Start: 2021-07-21 | End: 2021-07-21

## 2021-07-20 RX ORDER — LEVOTHYROXINE SODIUM 0.03 MG/1
25 TABLET ORAL DAILY
Status: CANCELLED | OUTPATIENT
Start: 2021-07-21

## 2021-07-20 RX ORDER — CLONIDINE HYDROCHLORIDE 0.1 MG/1
0.1 TABLET ORAL 3 TIMES DAILY
Status: CANCELLED | OUTPATIENT
Start: 2021-07-20

## 2021-07-20 RX ORDER — AMLODIPINE BESYLATE 10 MG/1
10 TABLET ORAL DAILY
Status: DISCONTINUED | OUTPATIENT
Start: 2021-07-20 | End: 2021-07-20 | Stop reason: HOSPADM

## 2021-07-20 RX ORDER — ONDANSETRON 4 MG/1
4 TABLET, FILM COATED ORAL EVERY 8 HOURS PRN
Status: CANCELLED | OUTPATIENT
Start: 2021-07-20

## 2021-07-20 RX ORDER — POLYETHYLENE GLYCOL 3350 17 G/17G
17 POWDER, FOR SOLUTION ORAL DAILY PRN
Status: CANCELLED | OUTPATIENT
Start: 2021-07-20

## 2021-07-20 RX ORDER — CEPHALEXIN 500 MG/1
500 CAPSULE ORAL EVERY 8 HOURS SCHEDULED
Status: CANCELLED | OUTPATIENT
Start: 2021-07-20 | End: 2021-08-03

## 2021-07-20 RX ORDER — ACETAMINOPHEN 325 MG/1
650 TABLET ORAL EVERY 4 HOURS PRN
Status: DISCONTINUED | OUTPATIENT
Start: 2021-07-20 | End: 2021-07-24 | Stop reason: HOSPADM

## 2021-07-20 RX ORDER — NICOTINE 21 MG/24HR
1 PATCH, TRANSDERMAL 24 HOURS TRANSDERMAL DAILY
Status: CANCELLED | OUTPATIENT
Start: 2021-07-21

## 2021-07-20 RX ORDER — NICOTINE 21 MG/24HR
1 PATCH, TRANSDERMAL 24 HOURS TRANSDERMAL DAILY
Status: DISCONTINUED | OUTPATIENT
Start: 2021-07-21 | End: 2021-07-23

## 2021-07-20 RX ORDER — LEVOTHYROXINE SODIUM 0.03 MG/1
25 TABLET ORAL DAILY
Status: DISCONTINUED | OUTPATIENT
Start: 2021-07-21 | End: 2021-07-24 | Stop reason: HOSPADM

## 2021-07-20 RX ORDER — SODIUM CHLORIDE 0.9 % (FLUSH) 0.9 %
10 SYRINGE (ML) INJECTION PRN
Status: DISCONTINUED | OUTPATIENT
Start: 2021-07-20 | End: 2021-07-20 | Stop reason: HOSPADM

## 2021-07-20 RX ORDER — TRAZODONE HYDROCHLORIDE 50 MG/1
50 TABLET ORAL NIGHTLY PRN
Status: DISCONTINUED | OUTPATIENT
Start: 2021-07-20 | End: 2021-07-24 | Stop reason: HOSPADM

## 2021-07-20 RX ADMIN — ENOXAPARIN SODIUM 40 MG: 40 INJECTION SUBCUTANEOUS at 08:17

## 2021-07-20 RX ADMIN — SODIUM CHLORIDE, PRESERVATIVE FREE 10 ML: 5 INJECTION INTRAVENOUS at 11:45

## 2021-07-20 RX ADMIN — SODIUM CHLORIDE 80 ML: 9 INJECTION, SOLUTION INTRAVENOUS at 11:44

## 2021-07-20 RX ADMIN — CLONIDINE HYDROCHLORIDE 0.1 MG: 0.1 TABLET ORAL at 08:17

## 2021-07-20 RX ADMIN — CLONIDINE HYDROCHLORIDE 0.1 MG: 0.1 TABLET ORAL at 14:07

## 2021-07-20 RX ADMIN — IBUPROFEN 600 MG: 600 TABLET, FILM COATED ORAL at 04:31

## 2021-07-20 RX ADMIN — IBUPROFEN 600 MG: 600 TABLET, FILM COATED ORAL at 14:02

## 2021-07-20 RX ADMIN — VANCOMYCIN HYDROCHLORIDE 1000 MG: 1 INJECTION, POWDER, LYOPHILIZED, FOR SOLUTION INTRAVENOUS at 06:07

## 2021-07-20 RX ADMIN — ACETAMINOPHEN 650 MG: 325 TABLET ORAL at 10:43

## 2021-07-20 RX ADMIN — CLONIDINE HYDROCHLORIDE 0.1 MG: 0.1 TABLET ORAL at 20:30

## 2021-07-20 RX ADMIN — CIPROFLOXACIN 400 MG: 2 INJECTION, SOLUTION INTRAVENOUS at 16:22

## 2021-07-20 RX ADMIN — IOPAMIDOL 75 ML: 755 INJECTION, SOLUTION INTRAVENOUS at 11:44

## 2021-07-20 RX ADMIN — VANCOMYCIN HYDROCHLORIDE 1000 MG: 1 INJECTION, POWDER, LYOPHILIZED, FOR SOLUTION INTRAVENOUS at 18:20

## 2021-07-20 RX ADMIN — CIPROFLOXACIN 400 MG: 2 INJECTION, SOLUTION INTRAVENOUS at 04:01

## 2021-07-20 RX ADMIN — AMLODIPINE BESYLATE 10 MG: 10 TABLET ORAL at 16:31

## 2021-07-20 RX ADMIN — SODIUM CHLORIDE, PRESERVATIVE FREE 10 ML: 5 INJECTION INTRAVENOUS at 08:17

## 2021-07-20 ASSESSMENT — SLEEP AND FATIGUE QUESTIONNAIRES
DO YOU HAVE DIFFICULTY SLEEPING: NO
DO YOU USE A SLEEP AID: NO
AVERAGE NUMBER OF SLEEP HOURS: 6

## 2021-07-20 ASSESSMENT — LIFESTYLE VARIABLES: HISTORY_ALCOHOL_USE: NO

## 2021-07-20 ASSESSMENT — PAIN SCALES - GENERAL
PAINLEVEL_OUTOF10: 4
PAINLEVEL_OUTOF10: 3
PAINLEVEL_OUTOF10: 5
PAINLEVEL_OUTOF10: 0

## 2021-07-20 NOTE — FLOWSHEET NOTE
Patient not happy about admission to [facility] and again expressed regrets and fact she doesn't feel the need as \"I know what to do, I did it for 3 years. \" She expressed appreciation for writer's continued follow up.     07/20/21 1202   Encounter Summary   Services provided to: Patient   Referral/Consult From: [facility]   (7-20-21)   Complexity of Encounter Moderate   Length of Encounter 15 minutes   Spiritual/Episcopal   Type Spiritual support   Assessment Approachable   Intervention Active listening;Explored feelings, thoughts, concerns;Sustaining presence/ Ministry of presence;Prayer;Discussed illness/injury and it's impact   Outcome Expressed gratitude;Engaged in conversation;Expressed feelings/needs/concerns;Venting emotion;Expressed regrets

## 2021-07-20 NOTE — CONSULTS
Sexual activity: Not on file   Other Topics Concern    Not on file   Social History Narrative    Not on file     Social Determinants of Health     Financial Resource Strain: Unknown    Difficulty of Paying Living Expenses: Patient refused   Food Insecurity: Unknown    Worried About Running Out of Food in the Last Year: Patient refused   [address] in the Last Year: Patient refused   Transportation Needs: Unknown    Lack of Transportation (Medical): Patient refused    Lack of Transportation (Non-Medical): Patient refused   Physical Activity:     Days of Exercise per Week:     Minutes of Exercise per Session:    Stress:     Feeling of Stress :    Social Connections:     Frequency of Communication with Friends and Family:     Frequency of Social Gatherings with Friends and Family:     Attends [employer] Services:     Active Member of Clubs or Organizations:     Attends Club or Organization Meetings:     Marital Status:    Intimate Partner Violence:     Fear of Current or Ex-Partner:     Emotionally Abused:     Physically Abused:     Sexually Abused:            ROS:  [x] All negative/unchanged except if checked.  Explain positive(checked items) below:  [] Constitutional  [] Eyes  [] Ear/Nose/Mouth/Throat  [] Respiratory  [] CV  [] GI  []   [] Musculoskeletal  [] Skin/Breast  [] Neurological  [] Endocrine  [] Heme/Lymph  [] Allergic/Immunologic    Explanation:     MEDICATIONS:    Current Facility-Administered Medications:     sodium chloride flush 0.9 % injection 10 mL, 10 mL, Intravenous, PRN, Maxime Haider MD, 10 mL at 07/20/21 1145    0.9 % sodium chloride bolus, 80 mL, Intravenous, Once, Maxime Haider MD, Last Rate: 160 mL/hr at 07/20/21 1144, 80 mL at 07/20/21 1144    ibuprofen (ADVIL;MOTRIN) tablet 600 mg, 600 mg, Oral, Q8H PRN, Sotero Toribio MD, 600 mg at 07/20/21 0431    cloNIDine (CATAPRES) tablet 0.1 mg, 0.1 mg, Oral, TID, Sotero Toribio MD, 0.1 mg at 07/20/21 0817   naloxone (NARCAN) injection 0.4 mg, 0.4 mg, Intravenous, PRN, Tabatha Palmer MD    [Held by provider] levothyroxine (SYNTHROID) tablet 25 mcg, 25 mcg, Oral, Daily, Saad Lata, APRN - CNP    [Held by provider] sertraline (ZOLOFT) tablet 25 mg, 25 mg, Oral, Daily, Saad Lata, APRN - CNP    sodium chloride flush 0.9 % injection 5-40 mL, 5-40 mL, Intravenous, 2 times per day, Saad Lata, APRN - CNP, 10 mL at 07/20/21 0817    sodium chloride flush 0.9 % injection 10 mL, 10 mL, Intravenous, PRN, Saad Lata, APRN - CNP    0.9 % sodium chloride infusion, 25 mL, Intravenous, PRN, Saad Lata, APRN - CNP, Last Rate: 100 mL/hr at 07/19/21 2109, 25 mL at 07/19/21 2109    potassium chloride (KLOR-CON M) extended release tablet 40 mEq, 40 mEq, Oral, PRN, 40 mEq at 07/18/21 1314 **OR** potassium bicarb-citric acid (EFFER-K) effervescent tablet 40 mEq, 40 mEq, Oral, PRN **OR** potassium chloride 10 mEq/100 mL IVPB (Peripheral Line), 10 mEq, Intravenous, PRN, Saad Lata, APRN - CNP    magnesium sulfate 1000 mg in dextrose 5% 100 mL IVPB, 1,000 mg, Intravenous, PRN, Saad Lata, APRN - CNP    enoxaparin (LOVENOX) injection 40 mg, 40 mg, Subcutaneous, Daily, Saad Lata, APRN - CNP, 40 mg at 07/20/21 5612    polyethylene glycol (GLYCOLAX) packet 17 g, 17 g, Oral, Daily PRN, Saad Lata, APRN - CNP    acetaminophen (TYLENOL) tablet 650 mg, 650 mg, Oral, Q6H PRN, 650 mg at 07/20/21 1043 **OR** acetaminophen (TYLENOL) suppository 650 mg, 650 mg, Rectal, Q6H PRN, Saad Lata, APRN - CNP    ondansetron (ZOFRAN) injection 4 mg, 4 mg, Intravenous, Q6H PRN, Saad Lata, APRN - CNP, 4 mg at 07/17/21 0251    nicotine (NICODERM CQ) 21 MG/24HR 1 patch, 1 patch, Transdermal, Daily, Saad Lata, APRN - CNP, 1 patch at 07/19/21 0925    promethazine (PHENERGAN) 25 mg in sodium chloride 0.9 % 50 mL IVPB, 25 mg, Intravenous, Q6H PRN, Saad Lata, APRN - CNP    glucose (GLUTOSE) 40 % oral gel 15 g, 15 g, Oral, PRN, Saad Lata, APRN - CNP    dextrose 50 % IV solution, 12.5 g, Intravenous, PRN, Teto Chimera, APRN - CNP    glucagon (rDNA) injection 1 mg, 1 mg, Intramuscular, PRN, Teto Chimera, APRN - CNP    dextrose 5 % solution, 100 mL/hr, Intravenous, PRN, Teto Chimera, APRN - CNP    vancomycin Layton Salcido) intermittent dosing (placeholder), , Other, RX Placeholder, Teto Chimera, APRN - CNP, Given at 07/16/21 0052    vancomycin 1000 mg IVPB in 250 mL D5W addavial, 1,000 mg, Intravenous, Q12H, Sarah Lopez MD, Stopped at 07/20/21 0740    ondansetron (ZOFRAN) tablet 4 mg, 4 mg, Oral, Q8H PRN, Nora Rojas MD    ciprofloxacin (CIPRO) IVPB 400 mg, 400 mg, Intravenous, Q12H, Nora Rojas MD, Stopped at 07/20/21 0432      Examination:  BP (!) 166/100   Pulse 59   Temp 97.7 °F (36.5 °C) (Oral)   Resp 20   Ht 5' 4\" (1.626 m)   Wt 171 lb 4.8 oz (77.7 kg)   SpO2 97%   BMI 29.40 kg/m²     Medication side effects(SE): none    Mental Status Examination:    Level of consciousness:  within normal limits   Appearance:  fair grooming and fair hygiene  Behavior/Motor:  no abnormalities noted  Attitude toward examiner:  cooperative  Speech:  spontaneous, normal rate and normal volume   Mood: anxious  Affect:  mood congruent  Thought processes:  goal directed and coherent   Thought content:  Homicidal ideation - none  Suicidal Ideation:  passive  Delusions:  no evidence of delusions  Perceptual Disturbance:  denies any perceptual disturbance  Cognition:  oriented to person, place, and time   Concentration intact  Insight fair   Judgement poor     ASSESSMENT:   Patient symptoms are:  [] Well controlled  [] Improving  [] Worsening  [] No change      Diagnosis:   Principal Problem:    Heroin overdose, accidental or unintentional, initial encounter ([facility] [address]  Active Problems:    Depression    Tobacco abuse    Anxiety    History of hypothyroidism    Rhabdomyolysis    Hypoglycemia    Cellulitis of right upper extremity  Resolved Problems:    * No resolved hospital problems. *      LABS:    Recent Labs     07/18/21  0529 07/19/21  0456 07/20/21  0524   WBC 3.5 3.8 3.4*   HGB 11.1* 10.9* 10.7*    202 208     Recent Labs     07/18/21  0529 07/19/21  0456 07/20/21  0524   * 134* 136   K 3.5* 3.9 4.2    104 103   CO2 21 20 22   BUN 3* 4* 6   CREATININE 0.69 0.60 0.63   GLUCOSE 93 95 106*     No results for input(s): BILITOT, ALKPHOS, AST, ALT in the last 72 hours. Lab Results   Component Value Date    BARBSCNU NEGATIVE 07/16/2021    LABBENZ NEGATIVE 07/16/2021    LABMETH NEGATIVE 07/16/2021    PPXUR NOT REPORTED 07/16/2021     Lab Results   Component Value Date    TSH 4.67 07/16/2021     No results found for: LITHIUM  No results found for: VALPROATE, CBMZ    RISK ASSESSMENT: Moderate risk of suicide    Treatment Plan:  Admit to psychiatry once medically cleared. Hold medications at this time  Risks, benefits, side effects, drug-to-drug interactions and alternatives to treatment were discussed. The patient  consented to treatment. Encourage patient to attend group and other milieu activities. Discharge planning discussed with the patient and treatment team.    PSYCHOTHERAPY/COUNSELING:  [] Therapeutic interview  [x] Supportive  [] CBT  [] Ongoing  [] Other    [x] Patient continues to need, on a daily basis, active treatment furnished directly by or requiring the supervision of inpatient psychiatric personnel                                             Asael Mcwilliams is a 46 y.o. female being evaluated face to face.     --Michael Up MD on 7/20/2021 at 12:07 PM    An electronic signature was used to authenticate this note. **This report has been created using voice recognition software. It may contain minor errors which are inherent in voice recognition technology. **

## 2021-07-20 NOTE — FLOWSHEET NOTE
Patient talked about being anxious about being discharged to the [facility]; patient \"wants to go home\"; welcomed prayer; listening presence     07/20/21 1737   Encounter Summary   Services provided to: Patient   Referral/Consult From: [facility]   (7/20/21)   Complexity of Encounter Moderate   Length of Encounter 15 minutes   Spiritual Assessment Completed Yes   Grief and Life Adjustment   Type Adjustment to illness   Assessment Approachable; Anxious; Hopeful;Coping;Helplessness   Intervention Active listening;Explored feelings, thoughts, concerns;Prayer;Sustaining presence/ Ministry of presence; Discussed belief system/Mormonism practices/lonny;Discussed illness/injury and it's impact   Outcome Comfort;Expressed gratitude;Engaged in conversation;Expressed feelings/needs/concerns;Coping; Hopeful;Receptive

## 2021-07-20 NOTE — DISCHARGE SUMMARY
[facility]    Discharge Summary     Patient ID: Parris Hopkins  :  1980   MRN: [id number]     ACCOUNT:  [de-identified]   Patient's PCP: Tomeka JERONIMO PA-C  Admit Date: 7/15/2021   Discharge Date: 2021    Length of Stay: 4  Code Status:  Full Code  Admitting Physician: Sonya Severino MD  Discharge Physician: Nagi Wick MD     Active Discharge Diagnoses:     Primary Problem  Heroin overdose, accidental or unintentional, initial encounter [facility])      [facility]    Diagnosis Date Noted    Cellulitis of right upper extremity [L03.113] 2021    Heroin overdose, accidental or unintentional, initial encounter ([facility] [T40.1X1A] 07/15/2021    Rhabdomyolysis [M62.82] 07/15/2021    Hypoglycemia [E16.2] 07/15/2021    Anxiety [F41.9] 2019    History of hypothyroidism [Z86.39] 2019    Depression [F32.9] 2014    Tobacco abuse [Z72.0] 2014       Admission Condition:  fair     Discharged Condition: fair    Hospital Stay:     Hospital Course:  Parris Hopkins is a 46 y.o. female who was admitted for the management of Heroin overdose, accidental or unintentional, initial encounter ([facility] , presented to ER with Drug Overdose      72-year-old  lady intravenous drug abuser admitted with right arm swelling drug overdose hypoxic respiratory failure received Narcan  Also noted to have extensive edema and right extremity with cellulitis receiving IV vancomycin and Cipro  No fever white cell count is 3.8  CT scan with contrast right elbow rule out abscess or collection clinically no signs of necrotizing fasciitis pending at time of dc summary  Will rev with result in [facility]  Positive for hep c,out pt with gi  hiv negative  Dc to bhi today  Pt agreeable ,medically clear for bhi  Continue oral abx as below         Significant therapeutic interventions:     Significant Diagnostic Studies:   Labs / Micro:        ,     Radiology:    XR SHOULDER RIGHT (MIN 2 VIEWS)    Result Date: 7/16/2021  EXAMINATION: THREE XRAY VIEWS OF THE RIGHT SHOULDER 7/16/2021 2:33 pm COMPARISON: None. HISTORY: ORDERING SYSTEM PROVIDED HISTORY: impingement shoulder TECHNOLOGIST PROVIDED HISTORY: impingement shoulder Reason for Exam: pain, no recent injuries. Acuity: Acute Type of Exam: Initial 37-year-old female with right shoulder pain; no recent injuries FINDINGS: Right AC and glenohumeral joints grossly unremarkable. Mild biapical pleural thickening. Cardiac monitor leads overlie the chest.  Visualized ribs appear intact. No acute fracture or dislocation. No acute fracture or dislocation. XR ELBOW RIGHT (MIN 3 VIEWS)    Result Date: 7/16/2021  EXAMINATION: THREE XRAY VIEWS OF THE RIGHT ELBOW 7/16/2021 12:11 am COMPARISON: None. HISTORY: ORDERING SYSTEM PROVIDED HISTORY: pain swelling red r/o foriegn body TECHNOLOGIST PROVIDED HISTORY: pain swelling red r/o foriegn body Reason for Exam: pain swelling red r/o foriegn body Acuity: Acute Type of Exam: Initial Additional signs and symptoms: pain swelling red r/o foriegn body Relevant Medical/Surgical History: pain swelling red r/o foriegn body FINDINGS: There is no fracture, dislocation or joint abnormality evident. Bone density is normal.  Soft tissue swelling is present anteriorly predominantly superior to the antecubital fossa. Volar soft tissue swelling. No osseous abnormality. CT Head WO Contrast    Result Date: 7/15/2021  EXAMINATION: CT OF THE HEAD WITHOUT CONTRAST  7/15/2021 6:25 pm TECHNIQUE: CT of the head was performed without the administration of intravenous contrast. Dose modulation, iterative reconstruction, and/or weight based adjustment of the mA/kV was utilized to reduce the radiation dose to as low as reasonably achievable. COMPARISON: None.  HISTORY: ORDERING SYSTEM PROVIDED HISTORY: altered mentation TECHNOLOGIST PROVIDED HISTORY: altered mentation Decision Support Exception - unselect if not a suspected or confirmed emergency medical condition->Emergency Medical Condition (MA) Is the patient pregnant?->No Reason for Exam: Altered mental status Acuity: Acute Type of Exam: Initial FINDINGS: BRAIN/VENTRICLES: There is no acute intracranial hemorrhage, mass effect or midline shift. No abnormal extra-axial fluid collection. The gray-white differentiation is maintained without evidence of an acute infarct. There is no evidence of hydrocephalus. ORBITS: The visualized portion of the orbits demonstrate no acute abnormality. SINUSES: The visualized paranasal sinuses and mastoid air cells demonstrate no acute abnormality. SOFT TISSUES/SKULL:  No acute abnormality of the visualized skull or soft tissues. No acute intracranial abnormality. XR CHEST PORTABLE    Result Date: 7/17/2021  EXAMINATION: ONE XRAY VIEW OF THE CHEST 7/17/2021 2:15 pm COMPARISON: 07/15/2021 HISTORY: ORDERING SYSTEM PROVIDED HISTORY: Hypoxemia TECHNOLOGIST PROVIDED HISTORY: Hypoxemia Reason for Exam: Shortness of breath Acuity: Acute Type of Exam: Initial FINDINGS: Mild discoid atelectasis in the left base. No focal infiltrates. .  There is no effusion or pneumothorax. The cardiomediastinal silhouette is without acute process. The osseous structures are without acute process. Mild left basilar atelectasis     XR CHEST PORTABLE    Result Date: 7/15/2021  EXAMINATION: ONE XRAY VIEW OF THE CHEST 7/15/2021 7:34 pm COMPARISON: None. HISTORY: ORDERING SYSTEM PROVIDED HISTORY: altered, given naloxone TECHNOLOGIST PROVIDED HISTORY: altered, given naloxone Reason for Exam: altered, given naloxone Acuity: Acute Type of Exam: Initial Additional signs and symptoms: altered, given naloxone Relevant Medical/Surgical History: altered, given naloxone 29-year-old female with altered mental status; given Naloxone FINDINGS: AP portable upright view of the chest. Trachea midline.   Cardiac and mediastinal contours within normal limits. No pneumothorax. No free air. No acute focal airspace consolidation or pleural effusions. No acute osseous abnormality evident. No acute focal airspace consolidation         Consultations:    Consults:     Final Specialist Recommendations/Findings:   IP CONSULT TO INTERNAL MEDICINE  PHARMACY TO DOSE VANCOMYCIN  IP CONSULT TO ORTHOPEDIC SURGERY  IP CONSULT TO PULMONOLOGY  IP CONSULT TO PSYCHIATRY  IP CONSULT TO INTERNAL MEDICINE      The patient was seen and examined on day of discharge and this discharge summary is in conjunction with any daily progress note from day of discharge. Discharge plan:     Disposition: [facility]    Physician Follow Up:     [place] [address]  [phone]    please call to find a family physician       Requiring Further Evaluation/Follow Up POST HOSPITALIZATION/Incidental Findings:    Diet: regular    Activity: As tolerated    Instructions to Patient:     Discharge Medications:      Medication List      ASK your doctor about these medications    levothyroxine 25 MCG tablet  Commonly known as: SYNTHROID  TAKE ONE TABLET BY MOUTH DAILY     sertraline 25 MG tablet  Commonly known as: Zoloft  Take 1 tablet by mouth daily            Time Spent on discharge is  35 mins in patient examination, evaluation, counseling as well as medication reconciliation, prescriptions for required medications, discharge plan and follow up. Electronically signed by   Urban Mohan MD  7/20/2021  9:12 AM      Thank you Dr. Nina JERONIMO, PA-C for the opportunity to be involved in this patient's care.

## 2021-07-20 NOTE — CARE COORDINATION
ONGOING DISCHARGE PLAN:    Patient to go to [facility] one medially cleared. remains on Iv Cipro, Iv Vanco,     Will continue to follow for additional discharge needs.     Electronically signed by Ruthy Crisostomo RN on 7/20/2021 at 1:57 PM

## 2021-07-20 NOTE — DISCHARGE INSTR - COC
Continuity of Care Form    Patient Name: Campos Lucio   :  1980  MRN:  [id number]    [facility] date:  7/15/2021  Discharge date:  ***    Code Status Order: Full Code   Advance Directives:     Admitting Physician:  Rod Egan MD  PCP: Artemio JERONIMO PAMarikaC    Discharging Nurse: [facility] Unit/Room#: 7343/4527-53  Discharging Unit Phone Number: ***    Emergency Contact:   Extended Emergency Contact Information  Primary Emergency Contact: Gus Cazares Phone: [phone]  Relation: Spouse  Secondary Emergency Contact: Mansoor Love, [place] [address], [address] Phone: [phone]  Mobile Phone: [phone]  Relation: Child    Past Surgical History:  Past Surgical History:   Procedure Laterality Date    TUBAL LIGATION         Immunization History:   Immunization History   Administered Date(s) Administered    Influenza Virus Vaccine 2016    Influenza, Darrall Spittle, IM, PF (6 mo and older Fluzone, Flulaval, Fluarix, and 3 yrs and older Afluria) 2019    Pneumococcal Polysaccharide (Wceomeygf42) 2019    Tdap (Boostrix, Adacel) 2016       Active Problems:  Patient Active Problem List   Diagnosis Code    Depression F32.9    Tobacco abuse Z72.0    Subclinical hypothyroidism E03.9    Anxiety F41.9    Fatigue R53.83    History of hypothyroidism Z86.39    Weight gain R63.5    Heroin overdose, accidental or unintentional, initial encounter ([facility] [address] T40.1X1A    Rhabdomyolysis M62.82    Hypoglycemia E16.2    Cellulitis of right upper extremity L03. 80    Depression with suicidal ideation F32.9, R45.851       Isolation/Infection:   Isolation          No Isolation        Patient Infection Status     Infection Onset Added Last Indicated Last Indicated By Review Planned Expiration Resolved Resolved By    None active    Resolved    COVID-19 Rule Out 07/15/21 07/15/21 07/15/21 COVID-19, Rapid (Ordered)   07/15/21 Rule-Out Test Resulted          Nurse Assessment:  Last Vital Signs: BP (!) 180/101 Pulse 50   Temp 97.8 °F (36.6 °C) (Oral)   Resp 20   Ht 5' 4\" (1.626 m)   Wt 171 lb 4.8 oz (77.7 kg)   SpO2 97%   BMI 29.40 kg/m²     Last documented pain score (0-10 scale): Pain Level: 4  Last Weight:   Wt Readings from Last 1 Encounters:   21 171 lb 4.8 oz (77.7 kg)     Mental Status:  {IP PT MENTAL STATUS:}    IV Access:  { ADALGISA IV ACCESS:498790050}    Nursing Mobility/ADLs:  Walking   {CHP DME JQCQ:581457454}  Transfer  {CHP DME POHX:268616290}  Bathing  {CHP DME ZNPS:864605121}  Dressing  {CHP DME VLU}  Toileting  {CHP DME NIGO:984781254}  Feeding  {CHP DME YCLU:255437066}  Med Admin  {CHP DME YDJL:704442343}  Med Delivery   { ADALGISA MED Delivery:185204807}    Wound Care Documentation and Therapy:        Elimination:  Continence:   · Bowel: {YES / PZ:68965}  · Bladder: {YES / BU:49872}  Urinary Catheter: {Urinary Catheter:283599916}   Colostomy/Ileostomy/Ileal Conduit: {YES / FQ:82203}       Date of Last BM: ***    Intake/Output Summary (Last 24 hours) at 2021 1622  Last data filed at 2021 1041  Gross per 24 hour   Intake 355 ml   Output 1750 ml   Net -1395 ml     I/O last 3 completed shifts:   In: 36 [P.O.:355]  Out: 1750 [Urine:1750]    Safety Concerns:     [address] Safety Concerns:792618314}    Impairments/Disabilities:      [address] Impairments/Disabilities:895112274}    Nutrition Therapy:  Current Nutrition Therapy:   [address] Diet List:519365593}    Routes of Feeding: {CHP DME Other Feedings:881265058}  Liquids: {Slp liquid thickness:46680}  Daily Fluid Restriction: {CHP DME Yes amt example:763147967}  Last Modified Barium Swallow with Video (Video Swallowing Test): {Done Not Done MHSN:633346719}    Treatments at the Time of Hospital Discharge:   Respiratory Treatments: ***  Oxygen Therapy:  {Therapy; copd oxygen:54913}  Ventilator:    {OSCAR BO Vent [facility]:336663197}    Rehab Therapies: {THERAPEUTIC INTERVENTION:0177017919}  Weight Bearing Status/Restrictions: {OSCAR BO Weight Bearin}  Other Medical Equipment (for information only, NOT a DME order):  {EQUIPMENT:038771018}  Other Treatments: ***    Patient's personal belongings (please select all that are sent with patient):  {CHP DME Belongings:325455690}    RN SIGNATURE:  {Esignature:551771175}    CASE MANAGEMENT/SOCIAL WORK SECTION    Inpatient Status Date: ***    Readmission Risk Assessment Score:  Readmission Risk              Risk of Unplanned Readmission:  8           Discharging to Facility/ Agency   · Name:   · Address:  · Phone:  · Fax:    Dialysis Facility (if applicable)   · Name:  · Address:  · Dialysis Schedule:  · Phone:  · Fax:    / signature: {Esignature:150624244}    PHYSICIAN SECTION    Prognosis: {Prognosis:5770662732}    Condition at Discharge: [address] [place] [address] Patient Condition:628781609}    Rehab Potential (if transferring to Rehab): {Prognosis:3953557219}    Recommended Labs or Other Treatments After Discharge: ***    Physician Certification: I certify the above information and transfer of Alma Spence  is necessary for the continuing treatment of the diagnosis listed and that she requires {Admit to Appropriate Level of Care:06283} for {GREATER/LESS:844437415} 30 days.      Update Admission H&P: {CHP DME Changes in OBYZI:840408222}    PHYSICIAN SIGNATURE:  {Esignature:524388947}

## 2021-07-21 ENCOUNTER — APPOINTMENT (OUTPATIENT)
Dept: GENERAL RADIOLOGY | Age: 41
DRG: 751 | End: 2021-07-21
Attending: PSYCHIATRY & NEUROLOGY
Payer: MEDICARE

## 2021-07-21 PROBLEM — F11.10 HEROIN ABUSE (HCC): Status: ACTIVE | Noted: 2021-07-21

## 2021-07-21 PROBLEM — F33.2 MDD (MAJOR DEPRESSIVE DISORDER), RECURRENT SEVERE, WITHOUT PSYCHOSIS (HCC): Status: ACTIVE | Noted: 2021-07-21

## 2021-07-21 PROBLEM — T40.1X2A INTENTIONAL HEROIN OVERDOSE (HCC): Status: ACTIVE | Noted: 2021-07-21

## 2021-07-21 PROCEDURE — APPSS60 APP SPLIT SHARED TIME 46-60 MINUTES: Performed by: NURSE PRACTITIONER

## 2021-07-21 PROCEDURE — 99253 IP/OBS CNSLTJ NEW/EST LOW 45: CPT | Performed by: INTERNAL MEDICINE

## 2021-07-21 PROCEDURE — 93005 ELECTROCARDIOGRAM TRACING: CPT | Performed by: NURSE PRACTITIONER

## 2021-07-21 PROCEDURE — 6370000000 HC RX 637 (ALT 250 FOR IP): Performed by: NURSE PRACTITIONER

## 2021-07-21 PROCEDURE — 6370000000 HC RX 637 (ALT 250 FOR IP): Performed by: PSYCHIATRY & NEUROLOGY

## 2021-07-21 PROCEDURE — 1240000000 HC EMOTIONAL WELLNESS R&B

## 2021-07-21 PROCEDURE — 73030 X-RAY EXAM OF SHOULDER: CPT

## 2021-07-21 PROCEDURE — 90792 PSYCH DIAG EVAL W/MED SRVCS: CPT | Performed by: PSYCHIATRY & NEUROLOGY

## 2021-07-21 RX ORDER — ESCITALOPRAM OXALATE 10 MG/1
5 TABLET ORAL DAILY
Status: DISCONTINUED | OUTPATIENT
Start: 2021-07-21 | End: 2021-07-21

## 2021-07-21 RX ORDER — CEPHALEXIN 500 MG/1
500 CAPSULE ORAL EVERY 8 HOURS SCHEDULED
Status: DISCONTINUED | OUTPATIENT
Start: 2021-07-21 | End: 2021-07-24 | Stop reason: HOSPADM

## 2021-07-21 RX ORDER — FLUOXETINE 10 MG/1
10 CAPSULE ORAL DAILY
Status: DISCONTINUED | OUTPATIENT
Start: 2021-07-22 | End: 2021-07-23

## 2021-07-21 RX ORDER — AMLODIPINE BESYLATE 10 MG/1
10 TABLET ORAL DAILY
Status: DISCONTINUED | OUTPATIENT
Start: 2021-07-21 | End: 2021-07-24 | Stop reason: HOSPADM

## 2021-07-21 RX ORDER — DOXYCYCLINE 100 MG/1
100 CAPSULE ORAL EVERY 12 HOURS SCHEDULED
Status: DISCONTINUED | OUTPATIENT
Start: 2021-07-21 | End: 2021-07-24 | Stop reason: HOSPADM

## 2021-07-21 RX ADMIN — IBUPROFEN 400 MG: 400 TABLET, FILM COATED ORAL at 15:17

## 2021-07-21 RX ADMIN — HYDROXYZINE HYDROCHLORIDE 50 MG: 50 TABLET, FILM COATED ORAL at 12:19

## 2021-07-21 RX ADMIN — AMLODIPINE BESYLATE 10 MG: 10 TABLET ORAL at 14:20

## 2021-07-21 RX ADMIN — LEVOTHYROXINE SODIUM 25 MCG: 0.03 TABLET ORAL at 09:37

## 2021-07-21 RX ADMIN — HYDROXYZINE HYDROCHLORIDE 50 MG: 50 TABLET, FILM COATED ORAL at 20:52

## 2021-07-21 RX ADMIN — CEPHALEXIN 500 MG: 500 CAPSULE ORAL at 14:20

## 2021-07-21 RX ADMIN — DOXYCYCLINE 100 MG: 100 CAPSULE ORAL at 20:52

## 2021-07-21 RX ADMIN — CEPHALEXIN 500 MG: 500 CAPSULE ORAL at 20:52

## 2021-07-21 RX ADMIN — TRAZODONE HYDROCHLORIDE 50 MG: 50 TABLET ORAL at 20:52

## 2021-07-21 RX ADMIN — IBUPROFEN 400 MG: 400 TABLET, FILM COATED ORAL at 07:03

## 2021-07-21 ASSESSMENT — LIFESTYLE VARIABLES: HISTORY_ALCOHOL_USE: YES

## 2021-07-21 ASSESSMENT — PAIN DESCRIPTION - LOCATION
LOCATION: ARM
LOCATION: CHEST

## 2021-07-21 ASSESSMENT — PAIN DESCRIPTION - PAIN TYPE: TYPE: ACUTE PAIN

## 2021-07-21 ASSESSMENT — PAIN SCALES - GENERAL
PAINLEVEL_OUTOF10: 5
PAINLEVEL_OUTOF10: 3
PAINLEVEL_OUTOF10: 4

## 2021-07-21 ASSESSMENT — PAIN DESCRIPTION - ORIENTATION
ORIENTATION: LEFT;UPPER
ORIENTATION: RIGHT

## 2021-07-21 NOTE — BH NOTE
Patient arrived on unit, escorted by 2 [facility] staff members, patient had 2 bags of belongings as well as a small basket, patient ambulated onto the unit impendently with no assistance

## 2021-07-21 NOTE — PLAN OF CARE
Problem: Tobacco Use:  Goal: Inpatient tobacco use cessation counseling participation  7/21/2021 1515 by Elijah Hebert RN  Outcome: Ongoing     Problem: Altered Mood, Depressive Behavior:  Goal: Ability to disclose and discuss suicidal ideas will improve  7/21/2021 1515 by Elijah Hebert RN  Outcome: Ongoing   Patient is medication compliant and in behavioral control. Patient has attended groups and been social with peers in the common areas of the unit. Patient denies suicidal and homicidal ideation. Patient denies auditory and visual hallucinations. Patient verbalized increased anxiety this shift. Patient has engaged in ADL's. Patient has consumed meals and snacks this shift. Patient reports improved sleep. Patient has remained free from harm. Every 15 minute and spontaneous safety checks have been maintained. Problem: Pain:  Description: Pain management should include both nonpharmacologic and pharmacologic interventions. Goal: Pain level will decrease  Outcome: Ongoing     Problem: Pain:  Description: Pain management should include both nonpharmacologic and pharmacologic interventions. Goal: Control of acute pain  Outcome: Ongoing     Problem: Pain:  Description: Pain management should include both nonpharmacologic and pharmacologic interventions.   Goal: Control of chronic pain  Outcome: Ongoing yes

## 2021-07-21 NOTE — CONSULTS
[facility]    CONSULTATION / HISTORY AND PHYSICAL EXAMINATION            Date:   7/21/2021  Patient name:  Kerwin Duron  Date of admission:  7/20/2021  8:56 PM  MRN:   [id number]  Account:  [de-identified]  YOB: 1980  PCP:    Trinh JERONIMO PA-C  Room:   [address] [place] [address]  Code Status:    Full Code    Physician Requesting Consult: Rocklin Rosario, *    Reason for Consult:  medical management    Chief Complaint:     No chief complaint on file. History Obtained From:     Patient medical record nursing staff    History of Present Illness:   Patient m[facility] to [facility] with major depression,  Internal medicine, consulted for right upper extremity cellulitis, patient has history of IV drug abuse, she injected heroin in right antecubital fossa, swelling, redness at the local site, patient was initially treated with IV antibiotics, she mentioned that swelling is much improved  CT scan right elbow was done, which is negative for foreign body, no fluid collection present  Patient feels that swelling, redness of right elbow is much improved  Patient have restricted movement at the level of right shoulder, be started couple of days before, she injected IV drugs. Past Medical History:     Past Medical History:   Diagnosis Date    Anxiety 7/30/2019    Depression     Heroin abuse ([facility] [address] [address] 7/21/2021    Subclinical hypothyroidism 7/15/2014        Past Surgical History:     Past Surgical History:   Procedure Laterality Date    TUBAL LIGATION          Medications Prior to Admission:     Prior to Admission medications    Medication Sig Start Date End Date Taking? Authorizing Provider   levothyroxine (SYNTHROID) 25 MCG tablet TAKE ONE TABLET BY MOUTH DAILY 4/28/20   Jona Callejas PA-C   sertraline (ZOLOFT) 25 MG tablet Take 1 tablet by mouth daily 4/28/20   Jona Callejas PA-C        Allergies:     Patient has no known allergies.     Social History: Tobacco:    reports that she has been smoking cigarettes. She has a 7.50 pack-year smoking history. She has never used smokeless tobacco.  Alcohol:      reports no history of alcohol use. Drug Use:  reports current drug use. Family History:     Family History   Problem Relation Age of Onset    Diabetes Father     High Blood Pressure Father     Heart Disease Maternal Grandmother     Cancer Maternal Grandfather        Review of Systems:     Positive and Negative as described in HPI. CONSTITUTIONAL:  negative for fevers, chills, sweats, fatigue, weight loss  HEENT:  negative for vision, hearing changes, runny nose, throat pain  RESPIRATORY:  negative for shortness of breath, cough, congestion, wheezing. CARDIOVASCULAR:  negative for chest pain, palpitations.   GASTROINTESTINAL:  negative for nausea, vomiting, diarrhea, constipation, change in bowel habits, abdominal pain   GENITOURINARY:  negative for difficulty of urination, burning with urination, frequency   INTEGUMENT:  negative for rash, skin lesions, easy bruising   HEMATOLOGIC/LYMPHATIC:  negative for swelling/edema   ALLERGIC/IMMUNOLOGIC:  negative for urticaria , itching  ENDOCRINE:  negative increase in drinking, increase in urination, hot or cold intolerance  MUSCULOSKELETAL: Swelling, redness of right antecubital area, is  Improving, restricted movement at the level of right shoulder joint  NEUROLOGICAL:  negative for headaches, dizziness, lightheadedness, numbness, pain, tingling extremities  BEHAVIOR/PSYCH:      Physical Exam:     BP (!) 151/92   Pulse 73   Temp 97.5 °F (36.4 °C) (Oral)   Resp 14   Ht 5' 4\" (1.626 m)   Wt 171 lb (77.6 kg)   SpO2 100%   BMI 29.35 kg/m²   Temp (24hrs), Av.7 °F (36.5 °C), Min:97.5 °F (36.4 °C), Max:98 °F (36.7 °C)    Recent Labs     21  2047 21  0704 21  1621 21  194   POCGLU 155* 119* 95 101     No intake or output data in the 24 hours ending 21 1837    General Appearance:  alert, well appearing, and in no acute distress  Mental status: oriented to person, place, and time with normal affect  Head:  normocephalic, atraumatic. Eye: no icterus, redness, pupils equal and reactive, extraocular eye movements intact, conjunctiva clear  Ear: normal external ear, no discharge, hearing intact  Nose:  no drainage noted  Mouth: mucous membranes moist  Neck: supple, no carotid bruits, thyroid not palpable  Lungs: Bilateral equal air entry, clear to ausculation, no wheezing, rales or rhonchi, normal effort  Cardiovascular: normal rate, regular rhythm, no murmur, gallop, rub. Abdomen: Soft, nontender, nondistended, normal bowel sounds, no hepatomegaly or splenomegaly  Neurologic: There are no new focal motor or sensory deficits, normal muscle tone and bulk, no abnormal sensation, normal speech, cranial nerves II through XII grossly intact  Skin: No gross lesions, rashes, bruising or bleeding on exposed skin area  Extremities: Redness, swelling of right antecubital area, Restricted  movement of right shoulder  Psych:      Investigations:      Laboratory Testing:  Recent Results (from the past 24 hour(s))   POC Glucose Fingerstick    Collection Time: 07/20/21  7:42 PM   Result Value Ref Range    POC Glucose 101 65 - 105 mg/dL   EKG 12 Lead    Collection Time: 07/21/21  4:41 PM   Result Value Ref Range    Ventricular Rate 55 BPM    Atrial Rate 55 BPM    P-R Interval 152 ms    QRS Duration 72 ms    Q-T Interval 498 ms    QTc Calculation (Bazett) 476 ms    P Axis 61 degrees    R Axis 57 degrees    T Axis 54 degrees           Consultations:   IP CONSULT TO INTERNAL MEDICINE  Assessment :      Primary Problem  MDD (major depressive disorder), recurrent severe, without psychosis ([facility]    Active Hospital Problems    Diagnosis Date Noted    MDD (major depressive disorder), recurrent severe, without psychosis ([facility] [F33.2] 07/21/2021    Heroin abuse ([facility] [F11.10] 07/21/2021       Plan:
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
## 2021-07-21 NOTE — GROUP NOTE
Group Therapy Note    Date: 7/21/2021    Group Start Time: 0900  Group End Time: 1846  Group Topic: Community Meeting    KATE Duque Child        Group Therapy Note    Attendees: 5/16         Patient's Goal:  Stay positive    Notes:     Status After Intervention:  Unchanged    Participation Level: Active Listener and Interactive    Participation Quality: Appropriate and Attentive      Speech:  normal      Thought Process/Content: Logical      Affective Functioning: Congruent      Mood: anxious      Level of consciousness:  Alert and Attentive      Response to Learning: Progressing to goal      Endings: None Reported    Modes of Intervention: Education and Socialization      Discipline Responsible: Behavorial Health Tech      Signature:   Dunia Child
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
## 2021-07-21 NOTE — BH NOTE
Patient given tobacco quitline number 89798539153 at this time, refusing to call at this time, states \" I just dont want to quit now\"- patient given information as to the dangers of long term tobacco use. Continue to reinforce the importance of tobacco cessation.

## 2021-07-21 NOTE — PROGRESS NOTES
Behavioral Services  Medicare Certification Upon Admission    I certify that this patient's inpatient psychiatric hospital admission is medically necessary for:    [x] (1) Treatment which could reasonably be expected to improve this patient's condition,       [x] (2) Or for diagnostic study;     AND     [x](2) The inpatient psychiatric services are provided while the individual is under the care of a physician and are included in the individualized plan of care.     Estimated length of stay/service 3-5 days    [place] [facility]    Electronically signed by Edith Manjarrez MD on 7/21/2021 at 4:04 PM

## 2021-07-21 NOTE — PLAN OF CARE
[facility]  Initial Interdisciplinary Treatment Plan NO      Original treatment plan Date & Time: 7/21/2021  0756    Admission Type:  Admission Type: Voluntary    Reason for admission:   Reason for Admission: Patient overdosed on heroin, states she was clean for several months, used half of what she normally does but it was \"too much\".     Estimated Length of Stay:  5-7days  Estimated Discharge Date: to be determined by physician    PATIENT STRENGTHS:  Patient Strengths:Strengths: Communication, Positive Support  Patient Strengths and Limitations:Limitations: Inappropriate/potentially harmful leisure interests  Addictive Behavior: Addictive Behavior  In the past 3 months, have you felt or has someone told you that you have a problem with:  : None  Do you have a history of Chemical Use?: Yes  Do you have a history of Alcohol Use?: No  Do you have a history of Street Drug Abuse?: Yes  Histroy of Prescripton Drug Abuse?: No  Medical Problems:  Past Medical History:   Diagnosis Date    Anxiety 7/30/2019    Depression     Heroin abuse ([facility] [address] 7/21/2021    Subclinical hypothyroidism 7/15/2014     Status EXAM:Status and Exam  Normal: No  Facial Expression: Flat  Affect: Blunt  Level of Consciousness: Alert  Mood:Normal: No  Mood: Anxious  Motor Activity:Normal: Yes  Interview Behavior: Cooperative  Preception: [place] to Person, [place] to Time, [place] to Place, [place] to Situation  Attention:Normal: No  Attention: Distractible  Thought Processes: Circumstantial  Thought Content:Normal: No  Thought Content: Preoccupations  Hallucinations: None  Delusions: No  Memory:Normal: Yes  Insight and Judgment: No  Insight and Judgment: Poor Judgment, Poor Insight  Present Suicidal Ideation: No  Present Homicidal Ideation: No    EDUCATION:   Learner Progress Toward Treatment Goals: reviewed group plans and strategies for care    Method:group therapy, medication compliance, individualized assessments and care planning    Outcome: needs reinforcement    PATIENT GOALS: to be discussed with patient within 72 hours    PLAN/TREATMENT RECOMMENDATIONS:     continue group therapy , medications compliance, goal setting, individualized assessments and care, continue to monitor pt on unit      SHORT-TERM GOALS:   Time frame for Short-Term Goals: 5-7 days    LONG-TERM GOALS:  Time frame for Long-Term Goals: 6 months  Members Present in Team Meeting: See Signature Sheet    RITA Bolanos

## 2021-07-21 NOTE — GROUP NOTE
Group Therapy Note    Date: 7/21/2021    Group Start Time: 1100  Group End Time: 8838  Group Topic: Cognitive Skills    MAG Box        Group Therapy Note    Attendees: 9           Patient's Goal:  To improve communication skills     Notes:   Pt was pleasant and participated well     Status After Intervention:  Improved    Participation Level:  Active Listener and Interactive    Participation Quality: Appropriate, Attentive and Sharing      Speech:  normal      Thought Process/Content: Logical      Affective Functioning: congruent      Mood: euthymic      Level of consciousness:  Alert and Oriented x4      Response to Learning: Able to verbalize current knowledge/experience, Capable of insight and Progressing to goal      Endings: None Reported    Modes of Intervention: Education, Support and Problem-solving      Discipline Responsible: Psychoeducational Specialist      Signature:  Chan Yuen

## 2021-07-21 NOTE — GROUP NOTE
Group Therapy Note    Date: 7/21/2021    Group Start Time: 1430  Group End Time: 6445  Group Topic: Recreational    STCZ MARISA Lemon, MAG        Group Therapy Note    Attendees: 9         Patient's Goal:  To increase interpersonal interactions. Notes:  Patient attended and participated in group. Patient was appropriate and pleasant. Status After Intervention:  Improved    Participation Level:  Active Listener and Interactive    Participation Quality: Appropriate and Attentive      Speech:  normal      Thought Process/Content: Logical      Affective Functioning: Congruent      Mood: euthymic      Level of consciousness:  Alert, Oriented x4 and Attentive      Response to Learning: Able to verbalize current knowledge/experience, Able to verbalize/acknowledge new learning and Progressing to goal      Endings: None Reported    Modes of Intervention: Socialization, Exploration, Activity and Reality-testing      Discipline Responsible: Psychoeducational Specialist      Signature:  Velia Li

## 2021-07-21 NOTE — GROUP NOTE
Group Therapy Note    Date: 7/21/2021    Group Start Time: 1000  Group End Time: 0735  Group Topic: Psychotherapy    KATE AVENDAÑO    BINDU Delong LSW        Group Therapy Note    Attendees: 7/16         Patient's Goal:  Increase interpersonal relationship skills    Notes:  Patient was an active listener during group discussion    Status After Intervention:  Unchanged    Participation Level:  Active Listener    Participation Quality: Appropriate and Attentive      Speech:  normal      Thought Process/Content: Logical      Affective Functioning: Congruent      Mood: MARY      Level of consciousness:  Alert and Attentive      Response to Learning: Able to verbalize current knowledge/experience      Endings: None Reported    Modes of Intervention: Socialization and Exploration      Discipline Responsible: /Counselor      Signature:  BINDU Delong LSW

## 2021-07-22 ENCOUNTER — TELEPHONE (OUTPATIENT)
Dept: FAMILY MEDICINE CLINIC | Age: 41
End: 2021-07-22

## 2021-07-22 LAB
ALBUMIN SERPL-MCNC: 4 G/DL (ref 3.5–5.2)
ALBUMIN/GLOBULIN RATIO: ABNORMAL (ref 1–2.5)
ALP BLD-CCNC: 75 U/L (ref 35–104)
ALT SERPL-CCNC: 43 U/L (ref 5–33)
AST SERPL-CCNC: 43 U/L
BILIRUB SERPL-MCNC: 0.2 MG/DL (ref 0.3–1.2)
BILIRUBIN DIRECT: <0.08 MG/DL
BILIRUBIN, INDIRECT: ABNORMAL MG/DL (ref 0–1)
EKG ATRIAL RATE: 55 BPM
EKG P AXIS: 61 DEGREES
EKG P-R INTERVAL: 152 MS
EKG Q-T INTERVAL: 498 MS
EKG QRS DURATION: 72 MS
EKG QTC CALCULATION (BAZETT): 476 MS
EKG R AXIS: 57 DEGREES
EKG T AXIS: 54 DEGREES
EKG VENTRICULAR RATE: 55 BPM
GLOBULIN: ABNORMAL G/DL (ref 1.5–3.8)
TOTAL PROTEIN: 8.4 G/DL (ref 6.4–8.3)

## 2021-07-22 PROCEDURE — 80076 HEPATIC FUNCTION PANEL: CPT

## 2021-07-22 PROCEDURE — 36415 COLL VENOUS BLD VENIPUNCTURE: CPT

## 2021-07-22 PROCEDURE — 6370000000 HC RX 637 (ALT 250 FOR IP): Performed by: PSYCHIATRY & NEUROLOGY

## 2021-07-22 PROCEDURE — 6370000000 HC RX 637 (ALT 250 FOR IP): Performed by: NURSE PRACTITIONER

## 2021-07-22 PROCEDURE — 1240000000 HC EMOTIONAL WELLNESS R&B

## 2021-07-22 PROCEDURE — 99232 SBSQ HOSP IP/OBS MODERATE 35: CPT | Performed by: PSYCHIATRY & NEUROLOGY

## 2021-07-22 PROCEDURE — 99232 SBSQ HOSP IP/OBS MODERATE 35: CPT | Performed by: INTERNAL MEDICINE

## 2021-07-22 PROCEDURE — 93010 ELECTROCARDIOGRAM REPORT: CPT | Performed by: INTERNAL MEDICINE

## 2021-07-22 PROCEDURE — G0480 DRUG TEST DEF 1-7 CLASSES: HCPCS

## 2021-07-22 RX ADMIN — HYDROXYZINE HYDROCHLORIDE 50 MG: 50 TABLET, FILM COATED ORAL at 18:21

## 2021-07-22 RX ADMIN — IBUPROFEN 400 MG: 400 TABLET, FILM COATED ORAL at 08:13

## 2021-07-22 RX ADMIN — TRAZODONE HYDROCHLORIDE 50 MG: 50 TABLET ORAL at 20:49

## 2021-07-22 RX ADMIN — CEPHALEXIN 500 MG: 500 CAPSULE ORAL at 14:22

## 2021-07-22 RX ADMIN — CEPHALEXIN 500 MG: 500 CAPSULE ORAL at 06:36

## 2021-07-22 RX ADMIN — CEPHALEXIN 500 MG: 500 CAPSULE ORAL at 20:49

## 2021-07-22 RX ADMIN — AMLODIPINE BESYLATE 10 MG: 10 TABLET ORAL at 08:14

## 2021-07-22 RX ADMIN — FLUOXETINE 10 MG: 10 CAPSULE ORAL at 08:14

## 2021-07-22 RX ADMIN — DOXYCYCLINE 100 MG: 100 CAPSULE ORAL at 20:49

## 2021-07-22 RX ADMIN — LEVOTHYROXINE SODIUM 25 MCG: 0.03 TABLET ORAL at 08:14

## 2021-07-22 RX ADMIN — HYDROXYZINE HYDROCHLORIDE 50 MG: 50 TABLET, FILM COATED ORAL at 20:49

## 2021-07-22 RX ADMIN — ACETAMINOPHEN 650 MG: 325 TABLET ORAL at 20:14

## 2021-07-22 RX ADMIN — DOXYCYCLINE 100 MG: 100 CAPSULE ORAL at 08:14

## 2021-07-22 RX ADMIN — IBUPROFEN 400 MG: 400 TABLET, FILM COATED ORAL at 16:30

## 2021-07-22 ASSESSMENT — PAIN SCALES - GENERAL
PAINLEVEL_OUTOF10: 4
PAINLEVEL_OUTOF10: 5
PAINLEVEL_OUTOF10: 4

## 2021-07-22 ASSESSMENT — PAIN DESCRIPTION - ORIENTATION
ORIENTATION: LEFT
ORIENTATION: RIGHT;LEFT;UPPER

## 2021-07-22 ASSESSMENT — PAIN DESCRIPTION - DESCRIPTORS: DESCRIPTORS: TIGHTNESS

## 2021-07-22 ASSESSMENT — PAIN - FUNCTIONAL ASSESSMENT: PAIN_FUNCTIONAL_ASSESSMENT: ACTIVITIES ARE NOT PREVENTED

## 2021-07-22 ASSESSMENT — PAIN DESCRIPTION - PAIN TYPE
TYPE: ACUTE PAIN
TYPE: ACUTE PAIN

## 2021-07-22 ASSESSMENT — PAIN DESCRIPTION - FREQUENCY: FREQUENCY: CONTINUOUS

## 2021-07-22 ASSESSMENT — PAIN DESCRIPTION - PROGRESSION: CLINICAL_PROGRESSION: NOT CHANGED

## 2021-07-22 ASSESSMENT — PAIN DESCRIPTION - LOCATION
LOCATION: CHEST
LOCATION: ARM;CHEST

## 2021-07-22 ASSESSMENT — PAIN DESCRIPTION - ONSET: ONSET: ON-GOING

## 2021-07-22 NOTE — BH NOTE
Pt is anxious as evidence by having feelings of impending doom, poor concentration and restlessness  Staff attempted to find and relieve the distress by talking to patient, and offering an as needed medication. Pt accepted PRN medications, and 1:1 talk time.

## 2021-07-22 NOTE — GROUP NOTE
Group Therapy Note    Date: 7/22/2021    Group Start Time: 0900  Group End Time: 0920  Group Topic: Group Documentation    KATE AVENDAÑO    Lawerance Bias        Group Therapy Note    Attendees: 4/19         Patient's Goal: D/C planning - attend groups, work on +coping  Notes:  Goal setting group    Status After Intervention:  Improved    Participation Level:  Active Listener and Interactive    Participation Quality: Appropriate, Attentive, Sharing and Supportive      Speech:  normal      Thought Process/Content: Logical      Affective Functioning: Congruent      Mood: anxious      Level of consciousness:  Alert, Oriented x4 and Attentive      Response to Learning: Able to verbalize current knowledge/experience, Able to verbalize/acknowledge new learning, Able to retain information and Capable of insight      Endings: None Reported    Modes of Intervention: Education, Support, Socialization, Exploration and Problem-solving      Discipline Responsible: [address]      Signature:  Lawerance Bias

## 2021-07-22 NOTE — PROGRESS NOTES
[facility] 52 Internal Medicine    CONSULTATION / HISTORY AND PHYSICAL EXAMINATION            Date:   7/22/2021  Patient name:  Iram Alarcon  Date of admission:  7/20/2021  8:56 PM  MRN:   [id number]  Account:  [de-identified]  YOB: 1980  PCP:    Rosemarie JERONIMO PA-C  Room:   [facility]0103-  Code Status:    Full Code    Physician Requesting Consult: Shannen Hameed, *    Reason for Consult:  medical management    Chief Complaint:     No chief complaint on file. History Obtained From:     Patient medical record nursing staff    History of Present Illness:   Patient mated to [facility] with major depression,  Internal medicine, consulted for right upper extremity cellulitis, patient has history of IV drug abuse, she injected heroin in right antecubital fossa, swelling, redness at the local site, patient was initially treated with IV antibiotics, she mentioned that swelling is much improved  CT scan right elbow was done, which is negative for foreign body, no fluid collection present  Patient feels that swelling, redness of right elbow is much improved  Patient have restricted movement at the level of right shoulder, be started couple of days before, she injected IV drugs. Past Medical History:     Past Medical History:   Diagnosis Date    Anxiety 7/30/2019    Depression     Heroin abuse ([facility] [address] 7/21/2021    Subclinical hypothyroidism 7/15/2014        Past Surgical History:     Past Surgical History:   Procedure Laterality Date    TUBAL LIGATION          Medications Prior to Admission:     Prior to Admission medications    Medication Sig Start Date End Date Taking? Authorizing Provider   levothyroxine (SYNTHROID) 25 MCG tablet TAKE ONE TABLET BY MOUTH DAILY 4/28/20   Marina Martin PA-C   sertraline (ZOLOFT) 25 MG tablet Take 1 tablet by mouth daily 4/28/20   Marina Martin PA-C        Allergies:     Patient has no known allergies.     Social History: Tobacco:    reports that she has been smoking cigarettes. She has a 7.50 pack-year smoking history. She has never used smokeless tobacco.  Alcohol:      reports no history of alcohol use. Drug Use:  reports current drug use. Family History:     Family History   Problem Relation Age of Onset    Diabetes Father     High Blood Pressure Father     Heart Disease Maternal Grandmother     Cancer Maternal Grandfather        Review of Systems:     Positive and Negative as described in HPI. CONSTITUTIONAL:  negative for fevers, chills, sweats, fatigue, weight loss  HEENT:  negative for vision, hearing changes, runny nose, throat pain  RESPIRATORY:  negative for shortness of breath, cough, congestion, wheezing. CARDIOVASCULAR:  negative for chest pain, palpitations.   GASTROINTESTINAL:  negative for nausea, vomiting, diarrhea, constipation, change in bowel habits, abdominal pain   GENITOURINARY:  negative for difficulty of urination, burning with urination, frequency   INTEGUMENT:  negative for rash, skin lesions, easy bruising   HEMATOLOGIC/LYMPHATIC:  negative for swelling/edema   ALLERGIC/IMMUNOLOGIC:  negative for urticaria , itching  ENDOCRINE:  negative increase in drinking, increase in urination, hot or cold intolerance  MUSCULOSKELETAL: Swelling, redness of right antecubital area, is  Improving, restricted movement at the level of right shoulder joint  NEUROLOGICAL:  negative for headaches, dizziness, lightheadedness, numbness, pain, tingling extremities  BEHAVIOR/PSYCH:      Physical Exam:     BP (!) 138/90   Pulse 100   Temp 97.6 °F (36.4 °C) (Oral)   Resp 14   Ht 5' 4\" (1.626 m)   Wt 171 lb (77.6 kg)   SpO2 100%   BMI 29.35 kg/m²   Temp (24hrs), Av.7 °F (36.5 °C), Min:97.6 °F (36.4 °C), Max:97.8 °F (36.6 °C)    Recent Labs     217 21  0704 21  1621 21  1942   POCGLU 155* 119* 95 101     No intake or output data in the 24 hours ending 21 1531    General complete total 10 days of antibiotics till 7/25  3. Ordering x-ray of right shoulder    7/22  Xray shoulder is negative   Ordering PT   Cellulitis is improving   Complete antibiotics on 7/25  We will sign off, please call with questions  Noticed patient started on Norvasc 10 mg for blood pressure control  Nathalia Spence MD  7/22/2021  3:31 PM    Copy sent to Dr. Trinh JERONIMO, PA-C    Please note that this chart was generated using voice recognition Dragon dictation software. Although every effort was made to ensure the accuracy of this automated transcription, some errors in transcription may have occurred.

## 2021-07-22 NOTE — GROUP NOTE
Group Therapy Note    Date: 7/21/2021    Group Start Time: 2030  Group End Time: 2045  Group Topic: Wrap-Up    KATE Malik RN        Group Therapy Note:     Pt refused to attend Wrap-up group this evening after encouragement from staff. 1:1 talk time offered but pt declined. Will continue to encourage patient to attend unit group programming.         Signature:  Keely Sullivan RN

## 2021-07-22 NOTE — TELEPHONE ENCOUNTER
[facility] ([facility]) ED Follow up Call    Reason for ED visit:  Herion overdose      7/22/2021     Hi Madeleine Terrell , this is Siomara from Dr. Marimar JERONIMO's office, just calling to see how you are doing after your recent ED visit. [facility] for the patient to          FU appts/Provider:    No future appointments. VOICEMAIL DOCUMENTATION - ERASE IF NOT USED  Hi, this message is for Madeleine Terrell. This is Siomara Bernstein MA from [employer] [facility] office. Just calling to see how you are doing after your recent visit to the Emergency Room. [employer] [facility] wants to make sure you were able to fill any prescriptions and that you understand your discharge instructions. Please return our call if you need to make a follow up appointment with your provider or have any further needs.    Our phone number is

## 2021-07-22 NOTE — PLAN OF CARE
Problem: Tobacco Use:  Goal: Inpatient tobacco use cessation counseling participation  Description: Inpatient tobacco use cessation counseling participation  7/21/2021 2035 by Ferny Ott RN  Outcome: Ongoing  Patient refused tobacco use cessation counseling. Problem: Altered Mood, Depressive Behavior:  Goal: Ability to disclose and discuss suicidal ideas will improve  Description: Ability to disclose and discuss suicidal ideas will improve  7/21/2021 2035 by Ferny Ott RN  Outcome: Ongoing  Patient denied suicidal ideations. Patient denied having thoughts of self-harm. Patient observed every 15 minutes and as needed for safety and environmental factors. Problem: Pain:  Goal: Pain level will decrease  Description: Pain level will decrease  7/21/2021 2035 by Ferny Ott RN  Outcome: Ongoing  Patient complained of right shoulder pain. Patient went for X-ray to determine cause of pain. Goal: Control of acute pain  Description: Control of acute pain  7/21/2021 2035 by Ferny Ott RN  Outcome: Ongoing  Patient complained of right shoulder pain. Patient went for X-ray to determine cause of pain. Goal: Control of chronic pain  Description: Control of chronic pain  7/21/2021 2035 by Ferny Ott RN  Outcome: Ongoing  Patient complained of right shoulder pain. Patient went for X-ray to determine cause of pain.

## 2021-07-22 NOTE — GROUP NOTE
Group Therapy Note    Date: 7/22/2021    Group Start Time: 1330  Group End Time: 1400  Group Topic: Psychoeducation    KATE Zimmer      Patient declined to attend community resource group at [employer] despite encouragement from staff. 1:1 talk time offered by staff as alternative to group session.         Signature:  Amy Zimmer

## 2021-07-22 NOTE — GROUP NOTE
Group Therapy Note    Date: 7/22/2021    Group Start Time: 1000  Group End Time: 9989  Group Topic: Psychotherapy    KATE AVENDAÑO    BINDU Astorga LSW        Group Therapy Note    Attendees: 5/17         Patient's Goal:  Increase interpersonal relationship skills    Notes:  Patient was an active participant in group discussion    Status After Intervention:  Improved    Participation Level:  Active Listener and Interactive    Participation Quality: Appropriate, Attentive, Sharing and Supportive      Speech:  normal      Thought Process/Content: Logical      Affective Functioning: Congruent      Mood: euthymic      Level of consciousness:  Alert, Oriented x4 and Attentive      Response to Learning: Able to verbalize current knowledge/experience, Able to verbalize/acknowledge new learning, Able to retain information and Capable of insight      Endings: None Reported    Modes of Intervention: Socialization and Exploration      Discipline Responsible: /Counselor      Signature:  BINDU Astorga LSW

## 2021-07-22 NOTE — PLAN OF CARE
[facility]  Day 3 Interdisciplinary Treatment Plan NOTE    Review Date & Time: 7/22/2021 0924    Admission Type:   Admission Type: Voluntary    Reason for admission:  Reason for Admission: Patient overdosed on heroin, states she was clean for several months, used half of what she normally does but it was \"too much\".   Estimated Length of Stay: 5-7 days  Estimated Discharge Date Update: to be determined by physician    PATIENT STRENGTHS:  Patient Strengths Strengths: Communication, Positive Support  Patient Strengths and Limitations:Limitations: Inappropriate/potentially harmful leisure interests  Addictive Behavior:Addictive Behavior  In the past 3 months, have you felt or has someone told you that you have a problem with:  : None  Do you have a history of Chemical Use?: No  Do you have a history of Alcohol Use?: Yes  Do you have a history of Street Drug Abuse?: Yes  Histroy of Prescripton Drug Abuse?: No  Medical Problems:  Past Medical History:   Diagnosis Date    Anxiety 7/30/2019    Depression     Heroin abuse ([facility] [address] 7/21/2021    Subclinical hypothyroidism 7/15/2014       Risk:  Fall RiskTotal: 67  Prem Scale Prem Scale Score: 22  BVC Total: 0  Change in scores no Changes to plan of Care no    Status EXAM:   Status and Exam  Normal: No  Facial Expression: Flat  Affect: Appropriate  Level of Consciousness: Alert  Mood:Normal: No  Mood: Anxious  Motor Activity:Normal: Yes  Interview Behavior: Cooperative  Preception: [place] to Person, [place] to Time, [place] to Place, [place] to Situation  Attention:Normal: No  Attention: Distractible  Thought Processes: Circumstantial  Thought Content:Normal: No  Thought Content: Preoccupations  Hallucinations: None  Delusions: No  Memory:Normal: Yes  Insight and Judgment: No  Insight and Judgment: Poor Insight  Present Suicidal Ideation: No  Present Homicidal Ideation: No    Daily Assessment Last Entry:   Daily Sleep (WDL): Within Defined Limits         Patient Currently in Pain: Denies  Daily Nutrition (WDL): Within Defined Limits    Patient Monitoring:  Frequency of Checks: 4 times per hour, close    Psychiatric Symptoms:   Depression Symptoms  Depression Symptoms: Isolative, Loss of interest  Anxiety Symptoms  Anxiety Symptoms: Generalized  Nasrin Symptoms  Nasrin Symptoms: No problems reported or observed. Psychosis Symptoms  Delusion Type: No problems reported or observed. Suicide Risk CSSR-S:  1) Within the past month, have you wished you were dead or wished you could go to sleep and not wake up? : No  2) Have you actually had any thoughts of killing yourself? : No  6) Have you ever done anything, started to do anything, or prepared to do anything to end your life?: No  Change in Result No Change in Plan of care No      EDUCATION:   EDUCATION:   Learner Progress Toward Treatment Goals: Reviewed results and recommendations of this team, Reviewed group plan and strategies, Reviewed signs, symptoms and risk of self harm and violent behavior, Reviewed goals and plan of care    Method:small group, individual verbal education    Outcome:verbalized by patient, but needs reinforcement to obtain goals    PATIENT GOALS:  Short term: To focus and continue to attend groups. To work on discharge planning. Long term: To attend meetings and decrease stress.     PLAN/TREATMENT RECOMMENDATIONS UPDATE: continue with group therapies, increased socialization, continue planning for after discharge goals, continue with medication compliance    SHORT-TERM GOALS UPDATE:   Time frame for Short-Term Goals: 5-7 days    LONG-TERM GOALS UPDATE:   Time frame for Long-Term Goals: 6 months  Members Present in Team Meeting: See Signature Hilary 58

## 2021-07-22 NOTE — PROGRESS NOTES
Daily Progress Note  7/22/2021    Patient Name: Mannie Hager    CHIEF COMPLAINT: Depression, overdose on heroin         SUBJECTIVE:      Patient is seen today for a follow up assessment. Patient has been compliant with her medications, using Atarax and trazodone as needed for anxiety and sleep. Per staff patient is social with select peers on the milieu. Patient rates her depression 5/10, endorses anxiety states her anxiety is 6/10 (10 worst), denies suicidal ideation, contracts for safety while in unit. Denies auditory visual hallucinations. States that she is lab 5 to 6 hours, felt tired in the morning. Appetite is normal today. Patient was started on Prozac with her first dose today, denies side effects. She denies opiate withdrawal symptoms. We discussed discharge recovery services, patient states she is interested in Vivitrol. Reviewed with her risk benefit and alternative treatments, she states that she would consider outpatient recovery services for Vivitrol after discharge. Reviewed with her that we would do a urine drug screen for fentanyl, and do liver profile. Mutually agreed to revisit Vivitrol after results come back from labs today. Appetite:  [x] Normal/Adequate/Unchanged  [] Increased  [] Decreased      Sleep:       [] Normal/Adequate/Unchanged  [x] Fair  [] Poor      Group Attendance on Unit:   [] Yes  [x] Selectively    [] No    ROS:  [x] All negative/unchanged except if checked.  Explain positive (checked items) below:  [] Constitutional  [] Eyes  [] Ear/Nose/Mouth/Throat  [] Respiratory  [] CV  [] GI  []   [x] Musculoskeletal  [x] Skin/Breast  [] Neurological  [] Endocrine  [] Heme/Lymph  [] Allergic/Immunologic    Medication Side Effects: Denies         Mental Status Exam  Level of consciousness: Awake and alert  Appearance: Appropriate attire for setting, lying in bed with fair grooming and hygiene  Behavior/Motor: Approachable, no psychomotor abnormalities   Attitude toward side effect from the medication. We will continue to titrate her Prozac. Possible discharge is Saturday.      Electronically signed by Carmen Camarillo MD on 7/22/21 at 9:24 PM EDT Statement Selected

## 2021-07-23 PROCEDURE — 99232 SBSQ HOSP IP/OBS MODERATE 35: CPT | Performed by: PSYCHIATRY & NEUROLOGY

## 2021-07-23 PROCEDURE — 6370000000 HC RX 637 (ALT 250 FOR IP): Performed by: PSYCHIATRY & NEUROLOGY

## 2021-07-23 PROCEDURE — 6370000000 HC RX 637 (ALT 250 FOR IP): Performed by: NURSE PRACTITIONER

## 2021-07-23 PROCEDURE — 1240000000 HC EMOTIONAL WELLNESS R&B

## 2021-07-23 PROCEDURE — APPSS30 APP SPLIT SHARED TIME 16-30 MINUTES: Performed by: PSYCHIATRY & NEUROLOGY

## 2021-07-23 PROCEDURE — 97161 PT EVAL LOW COMPLEX 20 MIN: CPT

## 2021-07-23 RX ORDER — HYDROXYZINE 50 MG/1
50 TABLET, FILM COATED ORAL 3 TIMES DAILY PRN
Qty: 90 TABLET | Refills: 0 | Status: SHIPPED | OUTPATIENT
Start: 2021-07-23 | End: 2021-08-22

## 2021-07-23 RX ORDER — AMLODIPINE BESYLATE 10 MG/1
10 TABLET ORAL DAILY
Qty: 30 TABLET | Refills: 0 | Status: SHIPPED | OUTPATIENT
Start: 2021-07-24

## 2021-07-23 RX ORDER — NALTREXONE HYDROCHLORIDE 50 MG/1
50 TABLET, FILM COATED ORAL
Status: DISCONTINUED | OUTPATIENT
Start: 2021-07-24 | End: 2021-07-24 | Stop reason: HOSPADM

## 2021-07-23 RX ORDER — TRAZODONE HYDROCHLORIDE 50 MG/1
50 TABLET ORAL NIGHTLY PRN
Qty: 30 TABLET | Refills: 0 | Status: SHIPPED | OUTPATIENT
Start: 2021-07-23

## 2021-07-23 RX ORDER — FLUOXETINE HYDROCHLORIDE 20 MG/1
20 CAPSULE ORAL DAILY
Status: DISCONTINUED | OUTPATIENT
Start: 2021-07-24 | End: 2021-07-24 | Stop reason: HOSPADM

## 2021-07-23 RX ORDER — CEPHALEXIN 500 MG/1
500 CAPSULE ORAL EVERY 8 HOURS SCHEDULED
Qty: 15 CAPSULE | Refills: 0 | Status: SHIPPED | OUTPATIENT
Start: 2021-07-23

## 2021-07-23 RX ORDER — NALTREXONE HYDROCHLORIDE 50 MG/1
50 TABLET, FILM COATED ORAL
Qty: 30 TABLET | Refills: 0 | Status: SHIPPED | OUTPATIENT
Start: 2021-07-24

## 2021-07-23 RX ORDER — LEVOTHYROXINE SODIUM 0.03 MG/1
25 TABLET ORAL DAILY
Qty: 30 TABLET | Refills: 0 | Status: SHIPPED | OUTPATIENT
Start: 2021-07-24

## 2021-07-23 RX ORDER — FLUOXETINE HYDROCHLORIDE 20 MG/1
20 CAPSULE ORAL DAILY
Qty: 30 CAPSULE | Refills: 0 | Status: SHIPPED | OUTPATIENT
Start: 2021-07-24

## 2021-07-23 RX ADMIN — IBUPROFEN 400 MG: 400 TABLET, FILM COATED ORAL at 14:15

## 2021-07-23 RX ADMIN — LEVOTHYROXINE SODIUM 25 MCG: 0.03 TABLET ORAL at 08:34

## 2021-07-23 RX ADMIN — DOXYCYCLINE 100 MG: 100 CAPSULE ORAL at 21:07

## 2021-07-23 RX ADMIN — HYDROXYZINE HYDROCHLORIDE 50 MG: 50 TABLET, FILM COATED ORAL at 15:41

## 2021-07-23 RX ADMIN — TRAZODONE HYDROCHLORIDE 50 MG: 50 TABLET ORAL at 21:07

## 2021-07-23 RX ADMIN — AMLODIPINE BESYLATE 10 MG: 10 TABLET ORAL at 08:34

## 2021-07-23 RX ADMIN — CEPHALEXIN 500 MG: 500 CAPSULE ORAL at 14:10

## 2021-07-23 RX ADMIN — CEPHALEXIN 500 MG: 500 CAPSULE ORAL at 21:08

## 2021-07-23 RX ADMIN — CEPHALEXIN 500 MG: 500 CAPSULE ORAL at 06:45

## 2021-07-23 RX ADMIN — FLUOXETINE 10 MG: 10 CAPSULE ORAL at 08:34

## 2021-07-23 RX ADMIN — HYDROXYZINE HYDROCHLORIDE 50 MG: 50 TABLET, FILM COATED ORAL at 21:07

## 2021-07-23 RX ADMIN — DOXYCYCLINE 100 MG: 100 CAPSULE ORAL at 08:34

## 2021-07-23 ASSESSMENT — PAIN SCALES - GENERAL
PAINLEVEL_OUTOF10: 4
PAINLEVEL_OUTOF10: 4

## 2021-07-23 ASSESSMENT — PAIN - FUNCTIONAL ASSESSMENT: PAIN_FUNCTIONAL_ASSESSMENT: 0-10

## 2021-07-23 ASSESSMENT — PAIN DESCRIPTION - LOCATION: LOCATION: RIB CAGE

## 2021-07-23 NOTE — GROUP NOTE
Group Therapy Note    Date: 7/23/2021    Group Start Time: 1100  Group End Time: 6950  Group Topic: Music Therapy    KATE AVENDAÑO    Ghazala Meeter        Group Therapy Note    Pt did not attend music therapy skills group d/t resting in room despite staff invitation to attend.

## 2021-07-23 NOTE — PROGRESS NOTES
Daily Progress Note  7/23/2021    Patient Name: William Penny    CHIEF COMPLAINT: Depression, overdose on heroin         SUBJECTIVE:      Nursing staff report the patient continues to maintain medication adherence and has not required emergency medications in the last 24 hours. She continues to utilize hydroxyzine and trazodone as needed related to anxiety and insomnia and reports with good effect. Today she has showered and is more forward thinking focused on seeing her children including a recent high school graduate and plans to pick her up at discharge. She patient reports her mood as \"good \". She reports having little motivation she is freshly showered and has been more active on the milieu with group programming. She reports improved suicidal ideation, contracts for safety while in unit. Denies auditory visual hallucinations. She estimates sleeping 6 plus hours last night although is again each time the door is open for safety checks. She denies side effects related to her medications and is agreeable that her Prozac can be titrated upward starting tomorrow morning. Additionally she has discussed the risks versus benefits and alternatives of naltrexone orally prior to anticipation of Vivitrol on an outpatient basis and patient is agreeable with attending physician. liver profile has been completed. Patient denies having questions or concerns related to this treatment plan. Appetite:  [x] Normal/Adequate/Unchanged  [] Increased  [] Decreased      Sleep:       [] Normal/Adequate/Unchanged  [x] Fair  [] Poor      Group Attendance on Unit:   [] Yes  [x] Selectively    [] No    ROS:  [x] All negative/unchanged except if checked.  Explain positive (checked items) below:  [] Constitutional  [] Eyes  [] Ear/Nose/Mouth/Throat  [] Respiratory  [] CV  [] GI  []   [] Musculoskeletal  [] Skin/Breast  [] Neurological  [] Endocrine  [] Heme/Lymph  [] Allergic/Immunologic    Medication Side Effects: Denies Mental Status Exam  Level of consciousness: Awake and alert  Appearance: Appropriate attire for setting,  with standing in the room grooming and hygiene  Behavior/Motor: Approachable, no psychomotor abnormalities   Attitude toward examiner: Cooperative, attentive, good eye contact  Speech: Spontaneous normal tone and rate  Mood: Patient reports \"good \"  Affect: Mood congruent  Thought processes: Linear, goal oriented and coherent  Thought content: Denies homicidal ideation  Suicidal Ideation: Endorsing improved suicidal ideations,contracts for safety on the unit. Delusions: Not evident or reported by staff  Perceptual Disturbance: Denies auditory visual hallucinations  Cognition: Oriented to person location and circumstance  Memory: Age-appropriate  Insight & Judgement: Improving    Data   height is 5' 4\" (1.626 m) and weight is 171 lb (77.6 kg). Her oral temperature is 97.8 °F (36.6 °C). Her blood pressure is 135/91 (abnormal) and her pulse is 115. Her respiration is 14 and oxygen saturation is 100%.    Labs:   Admission on 07/20/2021   Component Date Value Ref Range Status    Ventricular Rate 07/21/2021 55  BPM Final    Atrial Rate 07/21/2021 55  BPM Final    P-R Interval 07/21/2021 152  ms Final    QRS Duration 07/21/2021 72  ms Final    Q-T Interval 07/21/2021 498  ms Final    QTc Calculation (Bazett) 07/21/2021 476  ms Final    P Axis 07/21/2021 61  degrees Final    R Axis 07/21/2021 57  degrees Final    T Axis 07/21/2021 54  degrees Final    Albumin 07/22/2021 4.0  3.5 - 5.2 g/dL Final    Alkaline Phosphatase 07/22/2021 75  35 - 104 U/L Final    ALT 07/22/2021 43* 5 - 33 U/L Final    AST 07/22/2021 43* <32 U/L Final    Total Bilirubin 07/22/2021 0.20* 0.3 - 1.2 mg/dL Final    Bilirubin, Direct 07/22/2021 <0.08  <0.31 mg/dL Final    Bilirubin, Indirect 07/22/2021 CANNOT BE CALCULATED  0.00 - 1.00 mg/dL Final    Total Protein 07/22/2021 8.4* 6.4 - 8.3 g/dL Final    Globulin 07/22/2021 NOT REPORTED  1.5 - 3.8 g/dL Final    Albumin/Globulin Ratio 07/22/2021 NOT REPORTED  1.0 - 2.5 Final         Reviewed patient's current plan of care and vital signs with nursing staff. Labs reviewed: [x] Yes LFTs from 722/21 at 1511 reviewed prior to naltrexone and Vivitrol outpatient as planned  Last EKG in EMR reviewed: [x] Yes    Medications  Current Facility-Administered Medications: [START ON 7/24/2021] FLUoxetine (PROZAC) capsule 20 mg, 20 mg, Oral, Daily  [START ON 7/24/2021] naltrexone (DEPADE) tablet 50 mg, 50 mg, Oral, Daily with breakfast  doxycycline monohydrate (MONODOX) capsule 100 mg, 100 mg, Oral, 2 times per day  cephALEXin (KEFLEX) capsule 500 mg, 500 mg, Oral, 3 times per day  amLODIPine (NORVASC) tablet 10 mg, 10 mg, Oral, Daily  acetaminophen (TYLENOL) tablet 650 mg, 650 mg, Oral, Q4H PRN  aluminum & magnesium hydroxide-simethicone (MAALOX) 200-200-20 MG/5ML suspension 30 mL, 30 mL, Oral, Q6H PRN  hydrOXYzine (ATARAX) tablet 50 mg, 50 mg, Oral, TID PRN  ibuprofen (ADVIL;MOTRIN) tablet 400 mg, 400 mg, Oral, Q6H PRN  polyethylene glycol (GLYCOLAX) packet 17 g, 17 g, Oral, Daily PRN  traZODone (DESYREL) tablet 50 mg, 50 mg, Oral, Nightly PRN  levothyroxine (SYNTHROID) tablet 25 mcg, 25 mcg, Oral, Daily    ASSESSMENT  MDD (major depressive disorder), recurrent severe, without psychosis ([facility] [address]         PLAN  Patient symptoms are: Slight improvement  Titrate Prozac 20 mg daily starting tomorrow morning  Start naltrexone 50 mg daily with plan to transition to Vivitrol injection outpatient to address cravings   liver profile and a urine for fentanyl in anticipation of Vivitrol prior to discharge  Monitor need and frequency of PRN medications. Encourage participation in groups and milieu. Attempt to develop insight. Psycho-education conducted. Supportive Therapy conducted. Probable discharge is to be determined by attending physician and potentially tomorrow  Follow-up daily while inpatient. Patient continues to be monitored in the inpatient psychiatric facility at [facility] for safety and stabilization. Patient continues to need, on a daily basis, active treatment furnished directly by or requiring the supervision of inpatient psychiatric personnel. Electronically signed by GREG Mendoza CNP on 7/23/2021 at 4:16 PM    **This report has been created using voice recognition software. It may contain minor errors which are inherent in voice recognition technology. **                                         Psychiatry Attending Attestation     I independently saw and evaluated the patient. I reviewed the Advance Practice Provider's documentation above. Any additional comments or changes to the Advance Practice Provider's documentation are stated below otherwise agree with assessment. Patient reports her suicidal thoughts are slowly improving. Reports feeling very defeated and hopeless as her family is giving her a hard time following the overdose. Reports having fleeting suicidal thoughts today. Able to contract for safety here on the unit. Reports dealing with constant cravings for heroin. Faiza discussed about starting naltrexone. She understood and agreed to the plan. We will start her on 50 mg daily. Also discussed with her about optimizing her Prozac to 20 mg daily. She understood and agreed to the plan. Possible discharge is in next 1 to 2 days if she continues to improve.     Electronically signed by Jordan Tapia MD on 7/23/21 at 4:42 PM EDT

## 2021-07-23 NOTE — PROGRESS NOTES
Physical Therapy    Facility/Department: [facility]  Initial Assessment    NAME: Alissa Martinez  : 1980  MRN: [id number]    Date of Service: 2021    Discharge Recommendations:  Outpatient PT (OP PT if symptoms persist)   PT Equipment Recommendations  Equipment Needed: No    Assessment   Body structures, Functions, Activity limitations: Decreased ROM; Decreased strength  Assessment: pt reports that she understands HEP. Pt and nursing report that she may be leaving today or tommorrow. Therapist recommends possible OP PT if symptoms persist.  Treatment Diagnosis: impaired mobility right shoulder S/P self injection of heroin right elbow  Specific instructions for Next Treatment: continue to advance right shoulder ROM program as tolerated  Prognosis: Excellent  Decision Making: Low Complexity  History: admitted due to depression w/ SI, hx heroin OD  Exam: ROM, MMT, balance and mobility assessments  Clinical Presentation: instructed in HEP for Codman's (CW and CCW), AROM right biceps curls and clasped hand activity for right shoulder flexion and press  PT Education: Plan of Care  Barriers to Learning: none  REQUIRES PT FOLLOW UP: Yes  Activity Tolerance  Activity Tolerance: Patient Tolerated treatment well       Patient Diagnosis(es): There were no encounter diagnoses. has a past medical history of Anxiety, Depression, Heroin abuse ([facility] [address] and Subclinical hypothyroidism. has a past surgical history that includes Tubal ligation.     Restrictions  Restrictions/Precautions  Restrictions/Precautions:  ((+) hep C)  Required Braces or Orthoses?: No  Vision/Hearing  Vision: Within Functional Limits (has glasses but chooses not to wear them)  Hearing: Within functional limits     Subjective  General  Patient assessed for rehabilitation services?: Yes  Response To Previous Treatment: Not applicable  Family / Caregiver Present: No  Referring Practitioner: Dr. Bridgette Galeano  Referral Date : 21  Diagnosis: depression w/ SI ideation  Follows Commands: Within Functional Limits  Other (Comment): OK per nursing to proceed w/ PT evaluation  General Comment  Comments: Pt hospitalized at [facility] from 7- through 7- due to heroin overdose. Pt transfered to [facility] post D/C from acute care. Per chart, pt had injected heroin in the right elbow cauing increased redness and swelling. CT scan of the right elbow shows moderate cellulitis. Subjective  Subjective: Pt reports that her right arm is messed up. Pt states that she has had limited ROM right shoulder after waking up wrong from sleeping.   Pain Screening  Patient Currently in Pain: Denies  Vital Signs  Patient Currently in Pain: Denies       Orientation  Orientation  Overall Orientation Status: Within Functional Limits  Social/Functional History  Social/Functional History  Lives With: Family (SO, dtr and Mother-in-law)  Type of Home: House  Home Layout: Two level (bedroom upstairs and bathroom first floor)  Home Access: Stairs to enter with rails  Entrance Stairs - Number of Steps: 5 steps w/ 2 rails to enter, 10 steps w/ left rail to 2nd floor  Entrance Stairs - Rails: Both  Bathroom Shower/Tub: Walk-in shower, Doors, Curtain  Bathroom Toilet: Standard  Bathroom Equipment: Grab bars in shower, Built-in shower seat, Hand-held shower  Home Equipment:  (no DME)  ADL Assistance: Independent  Homemaking Assistance: Independent  Homemaking Responsibilities: Yes  Ambulation Assistance: Independent (no device)  Transfer Assistance: Independent  Active : No (no license)  Patient's  Info: SO  Mode of Transportation: Car  IADL Comments: sleeps in a flat bed  Cognition        Objective          AROM RLE (degrees)  RLE AROM: WFL  AROM LLE (degrees)  LLE AROM : WFL  AROM RUE (degrees)  RUE AROM : Exceptions  R Shoulder Extension 0-45: 0-45  R Shoulder ABduction 0-180: 0-45  R Elbow Flexion 0-145: WFL  R Elbow Extension 145-0: WFL  R Forearm Pron 0-90: WFL  R Forearm Supination  0-90: WFL  R Wrist Flexion 0-80: WFL  R Wrist Extension 0-70: WFL  AROM LUE (degrees)  LUE AROM : WFL  Strength RLE  Strength RLE: WFL  Comment: 5/5  Strength LLE  Strength LLE: WFL  Comment: 5/5  Strength RUE  Strength RUE: Exception  R Shoulder Flexion: 2/5  R Shoulder ABduction: 2/5  R Elbow Flexion: 4-/5  R Elbow Extension: 4-/5  R Forearm Pron: 4/5  R Forearm Sup: 4/5  R Wrist Flexion: 4/5  R Wrist Extension: 4/5  Strength LUE  Strength LUE: WFL  Comment: 5/5     Sensation  Overall Sensation Status: WFL (denies)  Bed mobility  Scooting: Independent  Comment: pt reports full independent bed mobility  Transfers  Sit to Stand: Independent  Stand to sit:  Independent  Stand Pivot Transfers: Independent (no device)  Ambulation  Ambulation?: Yes  Ambulation 1  Surface: level tile  Device: No Device  Assistance: Independent  Gait Deviations: None  Stairs/Curb  Stairs?: No     Balance  Sitting - Static: Good  Sitting - Dynamic: Good  Standing - Static: Good (no device)  Standing - Dynamic: Good (no device)  Exercises  Upper Extremity: completed Codman's exercise clockwise and counterclockwise x 20 reps each, clasped hand AAROM for shoulder flexion and press and AROM right biceps curls x 10 reps     Plan   Plan  Times per week: 1 treatment  Times per day:  (1 treatment)  Specific instructions for Next Treatment: continue to advance right shoulder ROM program as tolerated  Current Treatment Recommendations: Strengthening, ROM, Home Exercise Program  Safety Devices  Type of devices: Nurse notified (pt left in common area in psych)    G-Code       OutComes Score                                                  AM-PAC Score  AM-PAC Inpatient Mobility Raw Score : 21 (07/23/21 1340)  AM-PAC Inpatient T-Scale Score : 50.25 (07/23/21 1340)  Mobility Inpatient CMS 0-100% Score: 28.97 (07/23/21 1340)  Mobility Inpatient CMS G-Code Modifier : CJ (07/23/21 1340)          Goals  Short term goals  Time Frame for Short term goals: 1 treatment  Short term goal 1: pt to demonstrate improved AROM right shoulder to assist dressing and bathing  Short term goal 2: pt to demonstrate good technique for HEP right shoulder  Patient Goals   Patient goals : D/C home today or tomorrow       Therapy Time   Individual Concurrent Group Co-treatment   Time In 1340         Time Out 1356         Minutes [facility],

## 2021-07-23 NOTE — PROGRESS NOTES
CLINICAL PHARMACY NOTE: MEDS TO BEDS    Total # of Prescriptions Filled: 7   The following medications were delivered to the patient:  · Hydroxyzine HCL 50mg  · Naltrexone HCL 50mg  · Trazodone HCL 50mg  · Cephalexin 500mg  · Amlodipine Besylate 10mg  · Levothyroxine Sodium 25mg  · Fluoxetine HCL 20mg    Additional Documentation:  Delivered Medication to Makayla Clarke

## 2021-07-23 NOTE — PLAN OF CARE
Problem: Altered Mood, Depressive Behavior:  Goal: Ability to disclose and discuss suicidal ideas will improve  Description: Ability to disclose and discuss suicidal ideas will improve  Outcome: Ongoing   Every 15 min checks maintained for patient safety.

## 2021-07-23 NOTE — GROUP NOTE
Group Therapy Note    Date: 7/23/2021    Group Start Time: 1000  Group End Time: 5638  Group Topic: Psychotherapy    [facility] BINDU Price LSW        Group Therapy Note    Attendees: 4/17         Patient was offered group therapy today but declined to participate despite encouragement from staff. 1:1 was offered.     Signature:  BINDU Don LSW

## 2021-07-23 NOTE — GROUP NOTE
Group Therapy Note    Date: 7/23/2021    Group Start Time: 0900  Group End Time: 0915  Group Topic: Community Meeting    [facility]    Sim Mon        Group Therapy Note    Pt did not attend community meeting skills group d/t resting in room despite staff invitation to attend.

## 2021-07-23 NOTE — GROUP NOTE
Group Therapy Note    Date: 7/23/2021    Group Start Time: 1330  Group End Time: 8140  Group Topic: Music Therapy    [facility]    Donnie Josse    Patient attempted group, though was pulled for meeting within first few minutes of group.  Did not return

## 2021-07-23 NOTE — PLAN OF CARE
Problem: Tobacco Use:  Goal: Inpatient tobacco use cessation counseling participation  Description: Inpatient tobacco use cessation counseling participation  7/22/2021 2105 by Alejandra Frye RN  Outcome: Ongoing  Discussed with patient the benefits to quitting smoking and potential dangers of tobacco.      Problem: Altered Mood, Depressive Behavior:  Goal: Ability to disclose and discuss suicidal ideas will improve  Description: Ability to disclose and discuss suicidal ideas will improve  7/22/2021 2105 by Alejandra Frye RN  Outcome: Ongoing  Safety plan reviewed with patient, agrees to approach staff when feeling upset. 15 minute and random checks maintained for safety. No violent or escalating behaviors noted during this shift. Patient is currently calm, controlled and medication-compliant. Patient denies suicidal ideation, homicidal ideation and hallucinations at this time. She does report some anxiety and depression. She remains isolative to her room. Patient reports discharge possibly on Monday. Problem: Pain:  Goal: Control of acute pain  Description: Control of acute pain  7/22/2021 2105 by Alejandra Frye RN  Outcome: Ongoing  Patient reports tightness to left side of chest that is unrelieved by as-needed medications.

## 2021-07-24 VITALS
BODY MASS INDEX: 29.19 KG/M2 | RESPIRATION RATE: 16 BRPM | TEMPERATURE: 98 F | DIASTOLIC BLOOD PRESSURE: 87 MMHG | HEIGHT: 64 IN | OXYGEN SATURATION: 100 % | SYSTOLIC BLOOD PRESSURE: 128 MMHG | HEART RATE: 105 BPM | WEIGHT: 171 LBS

## 2021-07-24 PROCEDURE — 6370000000 HC RX 637 (ALT 250 FOR IP): Performed by: PSYCHIATRY & NEUROLOGY

## 2021-07-24 PROCEDURE — 99239 HOSP IP/OBS DSCHRG MGMT >30: CPT | Performed by: PSYCHIATRY & NEUROLOGY

## 2021-07-24 PROCEDURE — 6370000000 HC RX 637 (ALT 250 FOR IP): Performed by: NURSE PRACTITIONER

## 2021-07-24 RX ADMIN — LEVOTHYROXINE SODIUM 25 MCG: 0.03 TABLET ORAL at 08:11

## 2021-07-24 RX ADMIN — NALTREXONE HYDROCHLORIDE 50 MG: 50 TABLET, FILM COATED ORAL at 08:10

## 2021-07-24 RX ADMIN — FLUOXETINE HYDROCHLORIDE 20 MG: 20 CAPSULE ORAL at 08:10

## 2021-07-24 RX ADMIN — CEPHALEXIN 500 MG: 500 CAPSULE ORAL at 06:35

## 2021-07-24 RX ADMIN — DOXYCYCLINE 100 MG: 100 CAPSULE ORAL at 08:10

## 2021-07-24 RX ADMIN — IBUPROFEN 400 MG: 400 TABLET, FILM COATED ORAL at 08:11

## 2021-07-24 RX ADMIN — IBUPROFEN 400 MG: 400 TABLET, FILM COATED ORAL at 01:18

## 2021-07-24 RX ADMIN — AMLODIPINE BESYLATE 10 MG: 10 TABLET ORAL at 08:10

## 2021-07-24 ASSESSMENT — PAIN DESCRIPTION - LOCATION: LOCATION: CHEST

## 2021-07-24 ASSESSMENT — PAIN DESCRIPTION - DESCRIPTORS: DESCRIPTORS: DISCOMFORT;ACHING

## 2021-07-24 ASSESSMENT — PAIN SCALES - GENERAL
PAINLEVEL_OUTOF10: 4
PAINLEVEL_OUTOF10: 4

## 2021-07-24 ASSESSMENT — PAIN - FUNCTIONAL ASSESSMENT
PAIN_FUNCTIONAL_ASSESSMENT: 0-10
PAIN_FUNCTIONAL_ASSESSMENT: 0-10

## 2021-07-24 NOTE — GROUP NOTE
Group Therapy Note    Date: 7/23/2021    Group Start Time: 2000  Group End Time: 2100  Group Topic: Wrap-Up    [facility]        Group Therapy Note    Attendees: 6         Patient's Goal:  I'm going home tomorrow    Notes:  I found my purpose,my family my children    Status After Intervention:  Improved    Participation Level:  Active Listener and Interactive    Participation Quality: Appropriate      Speech:  normal      Thought Process/Content: Logical      Affective Functioning: Congruent      Mood: elevated      Level of consciousness:  Alert and Oriented x4      Response to Learning: Able to verbalize current knowledge/experience      Endings: None Reported    Modes of Intervention: Problem-solving      Discipline Responsible: OyaGen      Signature:  Minh Flood

## 2021-07-24 NOTE — BH NOTE
Patient given tobacco quitline number 75200989180 at this time, refusing to call at this time, states \" I just dont want to quit now\"- patient given information as to the dangers of long term tobacco use. Continue to reinforce the importance of tobacco cessation.

## 2021-07-24 NOTE — PLAN OF CARE
Problem: Tobacco Use:  Goal: Inpatient tobacco use cessation counseling participation  Description: Inpatient tobacco use cessation counseling participation  7/23/2021 2247 by Shivani Burr RN  Outcome: Ongoing  Note: Patient's nicotine patch ordered for smoking cessation was discontinued by the provider due to non-use. Patient declines tobacco cessation education at this time. Problem: Altered Mood, Depressive Behavior:  Goal: Ability to disclose and discuss suicidal ideas will improve  Description: Ability to disclose and discuss suicidal ideas will improve  7/23/2021 2247 by Shivani Burr RN  Outcome: Ongoing  Note: Patient denies suicidal ideations at this time and agrees to seek assistance from staff should thoughts of self harm arise. She is hoping to be discharged on tomorrow and reports readiness. She was compliant with all scheduled hs medications. Emotional support and reassurance given. Problem: Pain:  Goal: Pain level will decrease  Description: Pain level will decrease  7/23/2021 2247 by Shivani Burr RN  Outcome: Ongoing  Note: Patient declines pain to writer this evening. Problem: Musculor/Skeletal Functional Status  Goal: Highest potential functional level  Outcome: Ongoing  Note: Patient continues to function at her highest potential level. She does still report some limited movement with right arm but denies any new pain or swelling to extremity. Will continue to monitor patient for safety and behavior.

## 2021-07-24 NOTE — DISCHARGE SUMMARY
Provider Discharge Summary     Patient ID:  Iram Alarcon  [id number]  25 y.o.  1980    Admit date: 7/20/2021    Discharge date and time: 7/24/2021  9:47 AM     Admitting Physician: Shannen Hameed MD     Discharge Physician: Shannen Hameed MD    Admission Diagnoses: Depression with suicidal ideation [F32.9, R45.851]    Discharge Diagnoses:      MDD (major depressive disorder), recurrent severe, without psychosis ([facility] [address]     Patient Active Problem List   Diagnosis Code    Depression F32.9    Tobacco abuse Z72.0    Subclinical hypothyroidism E03.9    Anxiety F41.9    Fatigue R53.83    History of hypothyroidism Z86.39    Weight gain R63.5    Heroin overdose, accidental or unintentional, initial encounter ([facility] [address] T40.1X1A    Rhabdomyolysis M62.82    Hypoglycemia E16.2    Cellulitis of right upper extremity L03. 80    Depression with suicidal ideation F32.9, R39.200    MDD (major depressive disorder), recurrent severe, without psychosis ([facility] [address] F33.2    Heroin abuse ([facility] [address] F11.10        Admission Condition: poor    Discharged Condition: stable    Indication for Admission: threat to self    History of Present Illnes (present tense wording is of findings from admission exam and are not necessarily indicative of current findings):   Iram Alarcon is a 46 y.o. female who has a past medical history of hypothyroidism, anxiety, depression and opioid abuse. Patient presented to the [facility] ED on 7/16/2021 after being found unresponsive following a heroin overdose. According to collateral information she had been clean from heroin for 5 years prior to the presenting overdose. She was admitted for further evaluation and medical stability. While admitted to the inpatient unit she overdosed a second time on heroin that remained in her purse. She was found unresponsive and CPR was initiated. She was defensive and anxious when evaluated by psychiatry.   She reported during the consultation that she psychiatric care. She reports that the Zoloft that she was taking made her tired. She states that historically she has taken Lexapro and felt that it was beneficial though reports that she would did not remain compliant with it for long. Hospital Course:   Upon admission, Donnell Reinoso was provided a safe secure environment, introduced to unit milieu. Patient participated in groups and individual therapies. Meds were adjusted as noted below. After few days of hospital care, patient began to feel improvement. Depression lifted, thoughts to harm self ceased. Sleep improved, appetite was good. On morning rounds 7/24/2021, Donnell Reinoso endorses feeling ready for discharge. Patient denies suicidal or homicidal ideations, denies hallucinations or delusions. Denies SE's from meds. It was decided that maximum benefit from hospital care had been achieved and patient can be discharged. Consults:   none    Significant Diagnostic Studies: Routine labs and diagnostics    Treatments: Psychotropic medications, therapy with group, milieu, and 1:1 with nurses, social workers, PA-C/CNP, and Attending physician.       Discharge Medications:  Current Discharge Medication List      START taking these medications    Details   hydrOXYzine (ATARAX) 50 MG tablet Take 1 tablet by mouth 3 times daily as needed for Anxiety  Qty: 90 tablet, Refills: 0      traZODone (DESYREL) 50 MG tablet Take 1 tablet by mouth nightly as needed for Sleep  Qty: 30 tablet, Refills: 0      naltrexone (DEPADE) 50 MG tablet Take 1 tablet by mouth daily (with breakfast)  Qty: 30 tablet, Refills: 0      amLODIPine (NORVASC) 10 MG tablet Take 1 tablet by mouth daily  Qty: 30 tablet, Refills: 0      cephALEXin (KEFLEX) 500 MG capsule Take 1 capsule by mouth every 8 hours  Qty: 15 capsule, Refills: 0      FLUoxetine (PROZAC) 20 MG capsule Take 1 capsule by mouth daily  Qty: 30 capsule, Refills: 0         CONTINUE these medications which have CHANGED    Details levothyroxine (SYNTHROID) 25 MCG tablet Take 1 tablet by mouth daily  Qty: 30 tablet, Refills: 0         STOP taking these medications       sertraline (ZOLOFT) 25 MG tablet Comments:   Reason for Stopping:                Core Measures statement:   Not applicable    Discharge Exam:  Level of consciousness:  Within normal limits  Appearance: Street clothes, seated, with good grooming  Behavior/Motor: No abnormalities noted  Attitude toward examiner:  Cooperative, attentive, good eye contact  Speech:  spontaneous, normal rate, normal volume and well articulated  Mood:  euthymic  Affect:  Full range  Thought processes:  linear, goal directed and coherent  Thought content:  denies homicidal ideation  Suicidal Ideation:  denies suicidal ideation  Delusions:  no evidence of delusions  Perceptual Disturbance:  denies any perceptual disturbance  Cognition:  Intact  Memory: age appropriate  Insight & Judgement: fair  Medication side effects: denies     Disposition: home    Patient Instructions: Activity: activity as tolerated  1. Patient instructed to take medications regularly and follow up with outpatient appointments. Follow-up as scheduled with Kassie Ritchie       Signed:    Electronically signed by Maia Tatum MD on 7/24/21 at 9:47 AM EDT    Time Spent on discharge is more than 34 minutes in the examination, evaluation, counseling and review of medications and discharge plan.

## 2021-07-24 NOTE — BH NOTE
[facility]  Discharge Note    Pt discharged with followings belongings:   Dentures: None  Vision - Corrective Lenses: None  Hearing Aid: None  Jewelry: None  Body Piercings Removed: No  Clothing: Footwear, Pants, Shirt, Undergarments (Comment)  Were All Patient Medications Collected?: Not Applicable  Other Valuables: Other (Comment) (lighter X3, cigarettes X8, sunglasses)   Valuables sent home with Pt or returned to patient. Patient education on aftercare instructions: yes  Information faxed to no fax number provided by N/A  at 12:18 PM .Patient verbalize understanding of AVS:  Yes. Pt discharged ambulatory with spouse to private residence.      Status EXAM upon discharge:  Status and Exam  Normal: No  Facial Expression: Flat, Brightened  Affect: Appropriate  Level of Consciousness: Alert  Mood:Normal: Yes  Mood: Helpless, Anxious  Motor Activity:Normal: Yes  Motor Activity: Decreased  Interview Behavior: Cooperative  Preception: [place] to Person, Shyrl Plump to Time, [place] to Place, [place] to Situation  Attention:Normal: Yes  Attention: Distractible  Thought Processes: Circumstantial  Thought Content:Normal: Yes  Thought Content: Preoccupations  Hallucinations: None  Delusions: No  Memory:Normal: Yes  Insight and Judgment: Yes  Insight and Judgment: Poor Judgment, Poor Insight  Present Suicidal Ideation: No  Present Homicidal Ideation: No      Metabolic Screening:    No results found for: LABA1C    No results found for: CHOL  No results found for: TRIG  Lab Results   Component Value Date    HDL 69 11/01/2019     No components found for: LDLCAL  No results found for: Rita Temple RN

## 2021-07-24 NOTE — BH NOTE
SAFETY PLAN    A suicide Safety Plan is a document that supports someone when they are having thoughts of suicide. Warning Signs that indicate a suicidal crisis may be developing: What (situations, thoughts, feelings, body sensations, behaviors, etc.) do you experience that lets you know you are beginning to think about suicide? 1. Go off medications  2. Mood is depressed and start to feel sad, hopeless, helpless, guilty, decline in self-esteem, excess worry, no interest in doing any pleasurable activities, unable to concentrate  3. Begin to cry over the smallest of things  4. Not eating or sleeping as normal  5. Relationship issues start happening  6. I become angry and start a fight  7. When I dont listen or respond to people in a good, positive way  8. Increase drug use      Internal Coping Strategies:  What things can I do (relaxation techniques, hobbies, physical activities, etc.) to take my mind off my problems without contacting another person? 1. Go to hospital discharge appointments and follow-up with community mental health counseling  2. Talk with other people  3. Learn to identify and control your emotions by new ways  4. Think before you speak or act; walk away from the situation  5. Join a support group in person or on Social Media  6. Take a time-out  7. Take deep breaths; use relaxation techniques  8. Get some exercise; go for a walk  9. Read; listen to music; watch a funny movie    10. Coping skills/ strategies - journal/ listen to music/ go for a walk/ read a book/ watch a funny movie/show / crafts / video game   11.  Grounding techniques- eat a sour candy or hot cinnamon candy / focus on colors, sounds, smells, textures on things around you / drink some herbal tea / eat a piece of dark chocolate / take a hot bath or shower / essential oils for smelling / meditate / color / arts and crafts    People whom I can ask for help: Who can I call when I need help - for example, friends, family, clergy, someone else? [address], spouse at [phone]  2. [employer] [employer] at [phone]    Professionals or [employer] I can contact during a crisis: Who can I call for help - for example, my doctor, my psychiatrist, my psychologist, a mental health provider, a suicide hotline? 1. PCP and Marisa  2. Suicide Prevention Lifeline: 7-152-004-TALK (2507)  3. [facility] Crisis [facility] Team, face-to-face services, call [phone] (7834)  4. [address] [place] [address]: 2-1-1, [phone] or 2-997.161.6298  5. [employer] (Crisis Intervention Team - CIT), 720.390.3711 or 9-1-1  6. [facility], 4-183.550.6396  7. National Association of Mental Illness, 6-521.936.6949  8. [facility] Substance Abuse National Helpline, 9-286-390-HELP (9822)  9. Crisis Text Line, Text 4HOPE to 298006 to connect with a crisis counselor  10. [facility], 3-867.589.7036  11. CHARLES (Rape, [address]), 3-421.282.5362  18. [employer]iance[employer]reatment. com (Alcohol / Drug help)  13. Call the Recovery Helpline at 26 314 296 (24 hours a day - 7 days a week)  14. COVID-19 Emotional Support Line: [facility] Emergency Services - for example, [address], [facility] suicide hotline,   1. [place] [address], 9-843.222.5492  2. [place] line at 773-191-CARE (6218) for 24/7 to help anyone having a mental crisis or thoughts of self-harm. The Crisis CARE number will also determine in a face-to-face screening needs to be done as well as the safest place. Once this is determined, the Crisis [facility] Team will be sent out to meet with the patient directly if required. 3. For [facility] - Crisis Number [phone] ([facility])  4. [place] at 5-522.582.9041  5. [facility], [facility] at 8-297.273.1413  6.  [facility] [phone]           7. Emily Lawton, [place], [place], [place], [place], [place], [place] - [phone]  8. Twyla Levan, [address], [address] [phone]  9. Titi Wyatt, [place], [place], [address] [facility] 1-137-573-HOPE (1908)      Making the environment safe: How can I make my environment (house/apartment/living space) safer? For example, can I remove guns, medications, and other items? 1. Remove unsafe objects  2. Keep Medications in safe and secure location  3.  Plan daily goals to help remember to stay on specific medications

## 2021-07-25 LAB
FENTANYL AND METABOLITES, URINE: 1.3 NG/ML
NORFENTANYL, URINE: 8.7 NG/ML

## 2021-07-26 NOTE — CARE COORDINATION
LAURENCE attempted to schedule patient a follow up appointment at her PCP office for Tawana Rubinstein however the office was closed and closed early on Fridays. LAURENCE will call to schedule an appointment on Monday for patient notify her by phone.
she quit on her own and was clean for three years prior to this overdose. She was hyper verbal during assessment but able to be redirected. Patient is open to treatment that may work around her schedule since she works third shift but at the time she was only interested in possibly attending meetings or groups.
your scheduled appointment    Discharge to home address on face-sheet:  [address]  Go on 7/24/2021  If Latha Rubio does not have a ride home, please send by [facility] transportation    Mohan Vargas  Phone: [phone]  Fax: [phone]    Call  Social work will contact you on Monday with a hospital discharge appointment

## 2022-05-07 ENCOUNTER — HOSPITAL ENCOUNTER (EMERGENCY)
Age: 42
Discharge: OTHER INSTITUTION | End: 2022-05-07
Attending: STUDENT IN AN ORGANIZED HEALTH CARE EDUCATION/TRAINING PROGRAM
Payer: MEDICARE

## 2022-05-07 VITALS
TEMPERATURE: 98 F | OXYGEN SATURATION: 99 % | SYSTOLIC BLOOD PRESSURE: 131 MMHG | BODY MASS INDEX: 19.63 KG/M2 | DIASTOLIC BLOOD PRESSURE: 86 MMHG | WEIGHT: 115 LBS | HEIGHT: 64 IN | HEART RATE: 79 BPM | RESPIRATION RATE: 14 BRPM

## 2022-05-07 DIAGNOSIS — F11.93 OPIATE WITHDRAWAL (HCC): Primary | ICD-10-CM

## 2022-05-07 LAB
ABSOLUTE EOS #: 0 K/UL (ref 0–0.4)
ABSOLUTE LYMPH #: 1 K/UL (ref 1–4.8)
ABSOLUTE MONO #: 0.3 K/UL (ref 0.1–1.3)
AMPHETAMINE SCREEN URINE: NEGATIVE
ANION GAP SERPL CALCULATED.3IONS-SCNC: 11 MMOL/L (ref 9–17)
BARBITURATE SCREEN URINE: NEGATIVE
BASOPHILS # BLD: 0 % (ref 0–2)
BASOPHILS ABSOLUTE: 0 K/UL (ref 0–0.2)
BENZODIAZEPINE SCREEN, URINE: NEGATIVE
BUN BLDV-MCNC: 13 MG/DL (ref 6–20)
CALCIUM SERPL-MCNC: 9.7 MG/DL (ref 8.6–10.4)
CANNABINOID SCREEN URINE: POSITIVE
CHLORIDE BLD-SCNC: 105 MMOL/L (ref 98–107)
CO2: 25 MMOL/L (ref 20–31)
COCAINE METABOLITE, URINE: POSITIVE
CREAT SERPL-MCNC: 0.62 MG/DL (ref 0.5–0.9)
EOSINOPHILS RELATIVE PERCENT: 0 % (ref 0–4)
ETHANOL PERCENT: <0.01 %
ETHANOL: <10 MG/DL
GFR AFRICAN AMERICAN: >60 ML/MIN
GFR NON-AFRICAN AMERICAN: >60 ML/MIN
GFR SERPL CREATININE-BSD FRML MDRD: ABNORMAL ML/MIN/{1.73_M2}
GLUCOSE BLD-MCNC: 101 MG/DL (ref 70–99)
HCG(URINE) PREGNANCY TEST: NEGATIVE
HCT VFR BLD CALC: 41.1 % (ref 36–46)
HEMOGLOBIN: 13.9 G/DL (ref 12–16)
LYMPHOCYTES # BLD: 24 % (ref 24–44)
MCH RBC QN AUTO: 27.8 PG (ref 26–34)
MCHC RBC AUTO-ENTMCNC: 33.9 G/DL (ref 31–37)
MCV RBC AUTO: 81.9 FL (ref 80–100)
METHADONE SCREEN, URINE: NEGATIVE
MONOCYTES # BLD: 8 % (ref 1–7)
OPIATES, URINE: NEGATIVE
OXYCODONE SCREEN URINE: NEGATIVE
PDW BLD-RTO: 15.8 % (ref 11.5–14.9)
PHENCYCLIDINE, URINE: NEGATIVE
PLATELET # BLD: 216 K/UL (ref 150–450)
PMV BLD AUTO: 7.5 FL (ref 6–12)
POTASSIUM SERPL-SCNC: 4.8 MMOL/L (ref 3.7–5.3)
RBC # BLD: 5.02 M/UL (ref 4–5.2)
SEG NEUTROPHILS: 68 % (ref 36–66)
SEGMENTED NEUTROPHILS ABSOLUTE COUNT: 2.9 K/UL (ref 1.3–9.1)
SODIUM BLD-SCNC: 141 MMOL/L (ref 135–144)
TEST INFORMATION: ABNORMAL
WBC # BLD: 4.2 K/UL (ref 3.5–11)

## 2022-05-07 PROCEDURE — 80048 BASIC METABOLIC PNL TOTAL CA: CPT

## 2022-05-07 PROCEDURE — 81025 URINE PREGNANCY TEST: CPT

## 2022-05-07 PROCEDURE — G0480 DRUG TEST DEF 1-7 CLASSES: HCPCS

## 2022-05-07 PROCEDURE — 99285 EMERGENCY DEPT VISIT HI MDM: CPT

## 2022-05-07 PROCEDURE — 85025 COMPLETE CBC W/AUTO DIFF WBC: CPT

## 2022-05-07 PROCEDURE — 80307 DRUG TEST PRSMV CHEM ANLYZR: CPT

## 2022-05-07 PROCEDURE — 6370000000 HC RX 637 (ALT 250 FOR IP): Performed by: STUDENT IN AN ORGANIZED HEALTH CARE EDUCATION/TRAINING PROGRAM

## 2022-05-07 PROCEDURE — 36415 COLL VENOUS BLD VENIPUNCTURE: CPT

## 2022-05-07 RX ORDER — HYDROXYZINE HYDROCHLORIDE 25 MG/1
25 TABLET, FILM COATED ORAL ONCE
Status: COMPLETED | OUTPATIENT
Start: 2022-05-07 | End: 2022-05-07

## 2022-05-07 RX ORDER — CLONIDINE HYDROCHLORIDE 0.1 MG/1
0.1 TABLET ORAL ONCE
Status: COMPLETED | OUTPATIENT
Start: 2022-05-07 | End: 2022-05-07

## 2022-05-07 RX ADMIN — HYDROXYZINE HYDROCHLORIDE 25 MG: 25 TABLET, FILM COATED ORAL at 15:26

## 2022-05-07 RX ADMIN — CLONIDINE HYDROCHLORIDE 0.1 MG: 0.1 TABLET ORAL at 15:26

## 2022-05-07 ASSESSMENT — ENCOUNTER SYMPTOMS
VOMITING: 0
SHORTNESS OF BREATH: 0
NAUSEA: 1
COUGH: 0
RHINORRHEA: 0
DIARRHEA: 0
ABDOMINAL PAIN: 0

## 2022-05-07 ASSESSMENT — PAIN DESCRIPTION - PAIN TYPE: TYPE: ACUTE PAIN

## 2022-05-07 ASSESSMENT — PAIN - FUNCTIONAL ASSESSMENT: PAIN_FUNCTIONAL_ASSESSMENT: 0-10

## 2022-05-07 ASSESSMENT — PAIN DESCRIPTION - DESCRIPTORS: DESCRIPTORS: ACHING

## 2022-05-07 ASSESSMENT — PATIENT HEALTH QUESTIONNAIRE - PHQ9: SUM OF ALL RESPONSES TO PHQ QUESTIONS 1-9: 18

## 2022-05-07 ASSESSMENT — PAIN SCALES - GENERAL: PAINLEVEL_OUTOF10: 10

## 2022-05-07 ASSESSMENT — PAIN DESCRIPTION - FREQUENCY: FREQUENCY: INTERMITTENT

## 2022-05-07 ASSESSMENT — PAIN DESCRIPTION - LOCATION: LOCATION: GENERALIZED

## 2022-05-07 ASSESSMENT — LIFESTYLE VARIABLES: HOW OFTEN DO YOU HAVE A DRINK CONTAINING ALCOHOL: NEVER

## 2022-05-07 NOTE — ED PROVIDER NOTES
[facility]  Emergency Department Encounter  Emergency Medicine Physician     Pt Name: Ahsok Uriostegui  MRN: [id number]  Ludwingfjenny 1980  Date of evaluation: 5/7/22  PCP:  Maranda Sylvester MD    [address] [place] [address]       Chief Complaint   Patient presents with    Withdrawal    Suicidal       HISTORY OF PRESENT ILLNESS  (Location/Symptom, Timing/Onset, Context/Setting, Quality, Duration, Modifying Factors, Severity.)    Ashok Uriostegui is a 46 y.o. female who presents with withdrawal symptoms. Patient states that she has been using fentanyl 3-4 times a day every day for the past 2years. She states that she decided to quit and last dose was last night of the fentanyl. Denies any other illicit substance use. She states that she is having some anxiety, some sweating, some restlessness, and overall feeling of unease. She states that she felt this way the last time she withdrew from fentanyl which was 5 years ago. She states that she was hoping to get referral to either inpatient or outpatient treatment center indicate control of some symptoms. She states that she is not suicidal, that she does not want to die. She states that she just feels so terrible from the withdrawal she feels as if she were dead. She states she is not homicidal or any plan to hurt others. PAST MEDICAL / SURGICAL / SOCIAL / FAMILY HISTORY    has a past medical history of Anxiety, Depression, Heroin abuse ([address] and Subclinical hypothyroidism. has a past surgical history that includes Tubal ligation.     Social History     Socioeconomic History    Marital status:      Spouse name: Not on file    Number of children: Not on file    Years of education: Not on file    Highest education level: Not on file   Occupational History    Not on file   Tobacco Use    Smoking status: Current Every Day Smoker     Packs/day: 0.50     Years: 15.00     Pack years: 7.50     Types: Cigarettes    Smokeless tobacco: Never Used   Vaping Use    Vaping Use: Unknown   Substance and Sexual Activity    Alcohol use: No     Alcohol/week: 0.0 standard drinks    Drug use: Yes     Types: Opiates      Comment: crack and fentanyl    Sexual activity: Not on file   Other Topics Concern    Not on file   Social History Narrative    Not on file     Social Determinants of Health     Financial Resource Strain:     Difficulty of Paying Living Expenses: Not on file   Food Insecurity:     Worried About Running Out of Food in the Last Year: Not on file    Surendra of Food in the Last Year: Not on file   Transportation Needs:     Lack of Transportation (Medical): Not on file    Lack of Transportation (Non-Medical): Not on file   Physical Activity:     Days of Exercise per Week: Not on file    Minutes of Exercise per Session: Not on file   Stress:     Feeling of Stress : Not on file   Social Connections:     Frequency of Communication with Friends and Family: Not on file    Frequency of Social Gatherings with Friends and Family: Not on file    Attends Christianity Services: Not on file    Active Member of [address] [place] [address] or Organizations: Not on file    Attends Club or Organization Meetings: Not on file    Marital Status: Not on file   Intimate Partner Violence:     Fear of Current or Ex-Partner: Not on file    Emotionally Abused: Not on file    Physically Abused: Not on file    Sexually Abused: Not on file   Housing Stability:     Unable to Pay for Housing in the Last Year: Not on file    Number of [place] in the Last Year: Not on file    Unstable Housing in the Last Year: Not on file       Family History   Problem Relation Age of Onset    Diabetes Father     High Blood Pressure Father     Heart Disease Maternal Grandmother     Cancer Maternal Grandfather        Allergies:    Patient has no known allergies. Home Medications:  Prior to Admission medications    Medication Sig Start Date End Date Taking?  Authorizing Provider   traZODone (DESYREL) 50 MG tablet Take 1 tablet by mouth nightly as needed for Sleep 7/23/21   Breanne Pickard MD   naltrexone (DEPADE) 50 MG tablet Take 1 tablet by mouth daily (with breakfast) 7/24/21   Breanne Pickard MD   amLODIPine (NORVASC) 10 MG tablet Take 1 tablet by mouth daily 7/24/21   Breanne Pickard MD   cephALEXin (KEFLEX) 500 MG capsule Take 1 capsule by mouth every 8 hours 7/23/21   Breanne Pickard MD   levothyroxine (SYNTHROID) 25 MCG tablet Take 1 tablet by mouth daily 7/24/21   Breanne Pickard MD   FLUoxetine (PROZAC) 20 MG capsule Take 1 capsule by mouth daily 7/24/21   Breanne Pickard MD       REVIEW OF SYSTEMS    (2-9 systems for level 4, 10 or more for level 5)    Review of Systems   Constitutional: Negative for chills, fatigue and fever. HENT: Negative for congestion and rhinorrhea. Respiratory: Negative for cough and shortness of breath. Cardiovascular: Negative for chest pain. Gastrointestinal: Positive for nausea. Negative for abdominal pain, diarrhea and vomiting. Genitourinary: Negative for flank pain. Musculoskeletal: Negative for myalgias. Neurological: Negative for headaches. Psychiatric/Behavioral: Positive for sleep disturbance. Negative for hallucinations and suicidal ideas. The patient is nervous/anxious. All other systems reviewed and are negative. PHYSICAL EXAM   (up to 7 for level 4, 8 or more for level 5)    INITIAL VITALS:   ED Triage Vitals [05/07/22 1327]   BP Temp Temp Source Pulse Resp SpO2 Height Weight   (!) 143/112 98 °F (36.7 °C) Oral 100 18 98 % 5' 4\" (1.626 m) 115 lb (52.2 kg)       Physical Exam  Vitals and nursing note reviewed. Constitutional:       General: She is not in acute distress. Appearance: She is well-developed. Cardiovascular:      Rate and Rhythm: Normal rate and regular rhythm. Pulses: Normal pulses. Radial pulses are 2+ on the right side and 2+ on the left side. Heart sounds: Normal heart sounds. No murmur heard. Pulmonary:      Effort: Pulmonary effort is normal. No respiratory distress. Breath sounds: Normal breath sounds. No decreased breath sounds. Abdominal:      General: There is no distension. Palpations: Abdomen is soft. Tenderness: There is no abdominal tenderness. Musculoskeletal:      Right lower leg: No edema. Left lower leg: No edema. Skin:     General: Skin is warm and dry. Capillary Refill: Capillary refill takes less than 2 seconds. Neurological:      General: No focal deficit present. Mental Status: She is alert and oriented to person, place, and time. GCS: GCS eye subscore is 4. GCS verbal subscore is 5. GCS motor subscore is 6. Cranial Nerves: No dysarthria or facial asymmetry. Psychiatric:         Attention and Perception: She does not perceive auditory or visual hallucinations. Mood and Affect: Affect normal. Mood is anxious. Speech: Speech normal.         Behavior: Behavior normal. Behavior is not aggressive, withdrawn, hyperactive or combative. Behavior is cooperative. Thought Content: Thought content does not include homicidal or suicidal ideation. Thought content does not include homicidal or suicidal plan. Cognition and Memory: Cognition is not impaired. Memory is not impaired.          Judgment: Judgment normal.         DIFFERENTIAL  DIAGNOSIS   PLAN (LABS / IMAGING / EKG):  Orders Placed This Encounter   Procedures    CBC with Auto Differential    Basic Metabolic Panel    URINE DRUG SCREEN    HCG, Pregnancy,Urine    Ethanol       MEDICATIONS ORDERED:  Orders Placed This Encounter   Medications    cloNIDine (CATAPRES) tablet 0.1 mg    hydrOXYzine (ATARAX) tablet 25 mg         DIAGNOSTIC RESULTS / EMERGENCYDEPARTMENT COURSE / MDM   LABS:  Labs Reviewed   CBC WITH AUTO DIFFERENTIAL - Abnormal; Notable for the following components:       Result Value RDW 15.8 (*)     Seg Neutrophils 68 (*)     Monocytes 8 (*)     All other components within normal limits   BASIC METABOLIC PANEL - Abnormal; Notable for the following components:    Glucose 101 (*)     All other components within normal limits   URINE DRUG SCREEN - Abnormal; Notable for the following components:    Cocaine Metabolite, Urine POSITIVE (*)     Cannabinoid Scrn, Ur POSITIVE (*)     All other components within normal limits   PREGNANCY, URINE   ETHANOL       RADIOLOGY:  No results found. Impression:  Patient presents with fentanyl withdrawal.  States that she is been using fentanyl 3-4 times a day every day for the past 2 years. States that she wants to quit. Last dose was last night. Patient is somewhat anxious but not agitated. Patient stated specifically to me that she is not suicidal, is not homicidal.  She simply states that withdrawal makes her feel so bad that she wishes she were dead but has no active plans to hurt herself or to hurt others. I do believe the patient's symptoms are all secondary to withdrawal and will attempt to get patient into an either an outpatient or inpatient treatment facility. Will speak with [facility] as they have both outpatient and inpatient facility. Will start patient on clonidine and Atarax for symptom control and send off lab work. EMERGENCY DEPARTMENT COURSE:  ED Course as of 05/07/22 1801   Sat May 07, 2022   1639 Patient is medically cleared for transfer to [facility] facility [AP]      ED Course User Index  [AP] Jarod López DO       MDM:  Symptoms well controlled with hydroxyzine and clonidine. Patient was accepted at [facility] for inpatient narcotic withdrawal treatment. Accepting physician is Dr. Silke Sharpe .  GEOVANNA paperwork completed by myself      CONSULTS:  None    CRITICAL CARE:  There was a high probability of clinically significant/life threatening deterioration in this patient's condition which required my urgent intervention. Total critical care time was 10 minutes. This excludes any time for separately reportable procedures. FINAL IMPRESSION     1. Opiate withdrawal ([facility] [address]          DISPOSITION / PLAN   DISPOSITION  - decision to transfer     PATIENT REFERRED TO:  No follow-up provider specified.     DISCHARGE MEDICATIONS:  New Prescriptions    No medications on file       Ronald Rose DO  Emergency Medicine Attending    (Please note that portions of this note were completed with a voice recognition program.  Efforts were made to edit the dictations but occasionally words are mis-transcribed.)         Ronald Rose DO  05/07/22 4036

## 2022-05-07 NOTE — ED NOTES
Patient has been accepted at [facility] for rehab/detox by Dr Shane Toscano. [facility] ETA [id number].       Alexander Henry RN  05/07/22 1270

## 2022-05-07 NOTE — ED TRIAGE NOTES
Pt states that she is withdrawing from fentanyl and having suicidal thoughts without a plan. Her last use was noon yesterday. She states sometimes she uses crack as well.

## 2022-05-07 NOTE — ED NOTES
Chart and results faxed to [facility] for review. Pt updated on plan of care.       Mariella Patel RN  05/07/22 5725

## 2022-05-07 NOTE — ED NOTES
Pt states she need help with addiction to fentanyl. Pt denies SI and HI. Pt willing to go to [facility] for detox/rehab. Waiting on urine drug screen before faxing info to [facility] for potential transfer.       Kodak Patino RN  05/07/22 7671

## 2023-03-02 NOTE — ED TRIAGE NOTES
Informed patient of negative Covid test. Mode of arrival (squad #, walk in, police, etc) :[facility] life squad 6        Chief complaint(s): overdose heroin       Arrival Note (brief scenario, treatment PTA, etc). : friend called after overdose with heroin and not awaken after water poured  on patient then 911 called [facility] gave 8 mg Narcan and [facility] gave 2 mg Narcan per jugular int awaken but is not fully awaken at present moves arms and legs and clears throat often anwers questions slowly with few words as possible    C= \"Have you ever felt that you should Cut down on your drinking? \"  No  A= \"Have people Annoyed you by criticizing your drinking? \"  No  G= \"Have you ever felt bad or Guilty about your drinking? \"  No  E= \"Have you ever had a drink as an Eye-opener first thing in the morning to steady your nerves or to help a hangover? \"  No      Deferred []      Reason for deferring: N/A    *If yes to two or more: probable alcohol abuse. *

## 2024-07-12 NOTE — PROGRESS NOTES
Writer agrees with [employer]'s charting Quality 130: Documentation Of Current Medications In The Medical Record: Current Medications Documented Quality 431: Preventive Care And Screening: Unhealthy Alcohol Use - Screening: Patient not identified as an unhealthy alcohol user when screened for unhealthy alcohol use using a systematic screening method Quality 226: Preventive Care And Screening: Tobacco Use: Screening And Cessation Intervention: Patient screened for tobacco use and is an ex/non-smoker Detail Level: Detailed